# Patient Record
Sex: FEMALE | Race: WHITE | NOT HISPANIC OR LATINO | Employment: FULL TIME | URBAN - METROPOLITAN AREA
[De-identification: names, ages, dates, MRNs, and addresses within clinical notes are randomized per-mention and may not be internally consistent; named-entity substitution may affect disease eponyms.]

---

## 2023-07-24 ENCOUNTER — LAB VISIT (OUTPATIENT)
Dept: LAB | Facility: HOSPITAL | Age: 28
End: 2023-07-24
Attending: OBSTETRICS & GYNECOLOGY
Payer: COMMERCIAL

## 2023-07-24 DIAGNOSIS — N91.2 ABSENCE OF MENSTRUATION: Primary | ICD-10-CM

## 2023-07-24 LAB
B-HCG FREE SERPL-ACNC: 2795.65 MIU/ML
PROGEST SERPL-MCNC: 2 NG/ML

## 2023-07-24 PROCEDURE — 84702 CHORIONIC GONADOTROPIN TEST: CPT

## 2023-07-24 PROCEDURE — 84144 ASSAY OF PROGESTERONE: CPT

## 2023-07-24 PROCEDURE — 36415 COLL VENOUS BLD VENIPUNCTURE: CPT

## 2023-07-27 ENCOUNTER — LAB VISIT (OUTPATIENT)
Dept: LAB | Facility: HOSPITAL | Age: 28
End: 2023-07-27
Attending: OBSTETRICS & GYNECOLOGY
Payer: COMMERCIAL

## 2023-07-27 DIAGNOSIS — N91.2 ABSENCE OF MENSTRUATION: Primary | ICD-10-CM

## 2023-07-27 LAB
B-HCG FREE SERPL-ACNC: 2953.44 MIU/ML
PROGEST SERPL-MCNC: 9.4 NG/ML

## 2023-07-27 PROCEDURE — 84702 CHORIONIC GONADOTROPIN TEST: CPT

## 2023-07-27 PROCEDURE — 84144 ASSAY OF PROGESTERONE: CPT

## 2023-07-27 PROCEDURE — 36415 COLL VENOUS BLD VENIPUNCTURE: CPT

## 2023-10-04 ENCOUNTER — TELEPHONE (OUTPATIENT)
Dept: INTERNAL MEDICINE | Facility: CLINIC | Age: 28
End: 2023-10-04
Payer: COMMERCIAL

## 2023-10-04 DIAGNOSIS — Z13.228 SCREENING FOR ENDOCRINE, NUTRITIONAL, METABOLIC AND IMMUNITY DISORDER: ICD-10-CM

## 2023-10-04 DIAGNOSIS — Z13.0 SCREENING FOR ENDOCRINE, NUTRITIONAL, METABOLIC AND IMMUNITY DISORDER: ICD-10-CM

## 2023-10-04 DIAGNOSIS — Z13.21 SCREENING FOR ENDOCRINE, NUTRITIONAL, METABOLIC AND IMMUNITY DISORDER: ICD-10-CM

## 2023-10-04 DIAGNOSIS — Z13.89 SCREENING FOR CARDIOVASCULAR, RESPIRATORY, AND GENITOURINARY DISEASES: Primary | ICD-10-CM

## 2023-10-04 DIAGNOSIS — Z13.29 SCREENING FOR ENDOCRINE, NUTRITIONAL, METABOLIC AND IMMUNITY DISORDER: ICD-10-CM

## 2023-10-04 DIAGNOSIS — Z13.83 SCREENING FOR CARDIOVASCULAR, RESPIRATORY, AND GENITOURINARY DISEASES: Primary | ICD-10-CM

## 2023-10-04 DIAGNOSIS — Z13.6 SCREENING FOR CARDIOVASCULAR, RESPIRATORY, AND GENITOURINARY DISEASES: Primary | ICD-10-CM

## 2023-11-20 ENCOUNTER — TELEPHONE (OUTPATIENT)
Dept: INTERNAL MEDICINE | Facility: CLINIC | Age: 28
End: 2023-11-20
Payer: COMMERCIAL

## 2023-11-20 NOTE — TELEPHONE ENCOUNTER
----- Message from Allison Malave LPN sent at 11/14/2023  3:46 PM CST -----  Regarding: Dr. Oakes 11/27/2023 Establish care 3:40pm  Are there any outstanding tasks in chart? No    Is there any documentation of tasks? No    Has the pt seen another physician, been to ER, UCC, or admitted to hospital since last visit?    Has the pt done blood work or imaging since last visit?     5. PLEASE HAVE PATIENT BRING MEDICATION LIST OR BOTTLES TO EVERY OFFICE VISIT

## 2023-11-27 ENCOUNTER — OFFICE VISIT (OUTPATIENT)
Dept: INTERNAL MEDICINE | Facility: CLINIC | Age: 28
End: 2023-11-27
Payer: COMMERCIAL

## 2023-11-27 VITALS
SYSTOLIC BLOOD PRESSURE: 110 MMHG | BODY MASS INDEX: 30.54 KG/M2 | WEIGHT: 206.19 LBS | DIASTOLIC BLOOD PRESSURE: 70 MMHG | HEIGHT: 69 IN | OXYGEN SATURATION: 100 % | HEART RATE: 77 BPM

## 2023-11-27 DIAGNOSIS — E01.0 THYROMEGALY: ICD-10-CM

## 2023-11-27 DIAGNOSIS — Z00.00 ANNUAL PHYSICAL EXAM: Primary | ICD-10-CM

## 2023-11-27 DIAGNOSIS — R11.2 NAUSEA AND VOMITING, UNSPECIFIED VOMITING TYPE: Primary | ICD-10-CM

## 2023-11-27 PROCEDURE — 1159F MED LIST DOCD IN RCRD: CPT | Mod: CPTII,,, | Performed by: INTERNAL MEDICINE

## 2023-11-27 PROCEDURE — 3008F BODY MASS INDEX DOCD: CPT | Mod: CPTII,,, | Performed by: INTERNAL MEDICINE

## 2023-11-27 PROCEDURE — 99385 PREV VISIT NEW AGE 18-39: CPT | Mod: ,,, | Performed by: INTERNAL MEDICINE

## 2023-11-27 PROCEDURE — 3078F DIAST BP <80 MM HG: CPT | Mod: CPTII,,, | Performed by: INTERNAL MEDICINE

## 2023-11-27 PROCEDURE — 99385 PR PREVENTIVE VISIT,NEW,18-39: ICD-10-PCS | Mod: ,,, | Performed by: INTERNAL MEDICINE

## 2023-11-27 PROCEDURE — 3078F PR MOST RECENT DIASTOLIC BLOOD PRESSURE < 80 MM HG: ICD-10-PCS | Mod: CPTII,,, | Performed by: INTERNAL MEDICINE

## 2023-11-27 PROCEDURE — 1160F PR REVIEW ALL MEDS BY PRESCRIBER/CLIN PHARMACIST DOCUMENTED: ICD-10-PCS | Mod: CPTII,,, | Performed by: INTERNAL MEDICINE

## 2023-11-27 PROCEDURE — 3074F PR MOST RECENT SYSTOLIC BLOOD PRESSURE < 130 MM HG: ICD-10-PCS | Mod: CPTII,,, | Performed by: INTERNAL MEDICINE

## 2023-11-27 PROCEDURE — 1159F PR MEDICATION LIST DOCUMENTED IN MEDICAL RECORD: ICD-10-PCS | Mod: CPTII,,, | Performed by: INTERNAL MEDICINE

## 2023-11-27 PROCEDURE — 1160F RVW MEDS BY RX/DR IN RCRD: CPT | Mod: CPTII,,, | Performed by: INTERNAL MEDICINE

## 2023-11-27 PROCEDURE — 3008F PR BODY MASS INDEX (BMI) DOCUMENTED: ICD-10-PCS | Mod: CPTII,,, | Performed by: INTERNAL MEDICINE

## 2023-11-27 PROCEDURE — 3074F SYST BP LT 130 MM HG: CPT | Mod: CPTII,,, | Performed by: INTERNAL MEDICINE

## 2023-11-27 RX ORDER — ONDANSETRON 4 MG/1
4 TABLET, ORALLY DISINTEGRATING ORAL EVERY 6 HOURS
COMMUNITY
Start: 2023-07-27 | End: 2023-11-27 | Stop reason: SDUPTHER

## 2023-11-27 RX ORDER — ONDANSETRON 4 MG/1
4 TABLET, ORALLY DISINTEGRATING ORAL EVERY 6 HOURS
Qty: 12 TABLET | Refills: 4 | Status: SHIPPED | OUTPATIENT
Start: 2023-11-27 | End: 2023-11-27 | Stop reason: SDUPTHER

## 2023-11-27 NOTE — PROGRESS NOTES
Patient ID: Ade Simms is a 28 y.o. female.    Chief Complaint: Establish Care      HPI:   Patient presents here today for above reason.     Patient is without any major complaints or concerns at this time.       Wants  to get stablish   has strong family hx of  hypothyroisim  The patient's Health Maintenance was reviewed and the following appears to be due at this time:   Health Maintenance Due   Topic Date Due    Hepatitis C Screening  Never done    Cervical Cancer Screening  Never done    Lipid Panel  Never done    COVID-19 Vaccine (1) Never done    HIV Screening  Never done    TETANUS VACCINE  Never done    Influenza Vaccine (1) Never done        Past Medical History:  Past Medical History:   Diagnosis Date    Family history of genetic disease     Dad has tested positive for: familial thoracic aortic aneurysm and dissection (FTAAD)     History reviewed. No pertinent surgical history.  Review of patient's allergies indicates:  Not on File  Current Outpatient Medications on File Prior to Visit   Medication Sig Dispense Refill    [DISCONTINUED] ondansetron (ZOFRAN-ODT) 4 MG TbDL Take 4 mg by mouth every 6 (six) hours.       No current facility-administered medications on file prior to visit.     Social History     Socioeconomic History    Marital status:    Tobacco Use    Smoking status: Never    Smokeless tobacco: Never     History reviewed. No pertinent family history.    ROS:   Review of Systems  Constitutional: No weight gain, No fever, No chills, No fatigue.   Eyes: No blurring, No visual disturbances.   Ear/Nose/Mouth/Throat: No decreased hearing, No ear pain, No nasal congestion, No sore throat.   Respiratory: No shortness of breath, No cough, No wheezing.   Cardiovascular: No chest pain, No palpitations, No peripheral edema.   Gastrointestinal: No nausea, No vomiting, No diarrhea, No constipation, No abdominal pain.   Genitourinary: No dysuria, No hematuria.   Hematology/Lymphatics: No  "bruising tendency, No bleeding tendency, No swollen lymph glands.   Endocrine: No excessive thirst, No polyuria, No excessive hunger.   Musculoskeletal: No joint pain, No muscle pain, No decreased range of motion.   Integumentary: No rash, No pruritus.   Neurologic: No abnormal balance, No confusion, No headache.   Psychiatric: No anxiety, No depression, Not suicidal, No hallucinations.      Vitals/PE:   /70 (BP Location: Left arm, Patient Position: Sitting, BP Method: Medium (Manual))   Pulse 77   Ht 5' 9" (1.753 m)   Wt 93.5 kg (206 lb 3.2 oz)   SpO2 100%   BMI 30.45 kg/m²   Physical Exam    General: Alert and oriented, No acute distress.   Eye: Normal conjunctiva without exudate.  HENMT: Normocephalic/AT, Normal hearing, Oral mucosa is moist and pink   Neck: No goiter visualized.   Respiratory: Lungs CTAB, Respirations are non-labored, Breath sounds are equal, Symmetrical chest wall expansion.  Cardiovascular: Normal rate, Regular rhythm, No murmur, No edema.   Gastrointestinal: Non-distended.   Genitourinary: Deferred.  Musculoskeletal: Normal ROM, Normal gait, No deformities or amputations.  Integumentary: Warm, Dry, Intact. No diaphoresis, or flushing.  Neurologic: No focal deficits, Cranial Nerves II-XII are grossly intact.   Psychiatric: Cooperative, Appropriate mood & affect, Normal judgment, Non-suicidal.    Assessment/Plan:    ..  Problem List Items Addressed This Visit    None  Visit Diagnoses       Annual physical exam    -  Primary    CMP  FLP  TSH T4  testosterone levles    Relevant Orders    Comprehensive Metabolic Panel    TSH    T4, Free    Testosterone           Recommendations:   Diet (healthy food choices, reduce portions and overall calorie intake)  Exercise 30-45 minutes 3x per week  Avoid excessive alcohol and tobacco  Stay UTD with immunizations and preventative exams  Monthly self breast exams   Yearly Labs     ..  Medications Ordered This Encounter   Medications    ondansetron " (ZOFRAN-ODT) 4 MG TbDL     Sig: Take 1 tablet (4 mg total) by mouth every 6 (six) hours.     Dispense:  12 tablet     Refill:  4        ..  Orders Placed This Encounter   Procedures    Comprehensive Metabolic Panel    TSH    T4, Free    Testosterone         I have changed Ade Simms's ondansetron.    Orders Placed This Encounter   Procedures    Comprehensive Metabolic Panel     Standing Status:   Future     Standing Expiration Date:   2/27/2024    TSH     Standing Status:   Future     Standing Expiration Date:   2/27/2024    T4, Free     Standing Status:   Future     Standing Expiration Date:   1/25/2025    Testosterone     Standing Status:   Future     Standing Expiration Date:   1/25/2025       Education and counseling done face to face regarding medical conditions and plan. Contact office if new symptoms develop. Should any symptoms ever significantly worsen seek emergency medical attention/go to ER. Follow up at least yearly for wellness or sooner PRN. Nurse to call patient with any results. The patient is receptive, expresses understanding and is agreeable to plan. All questions have been answered.    Follow up in about 4 months (around 3/27/2024).

## 2023-11-28 RX ORDER — ONDANSETRON 4 MG/1
4 TABLET, ORALLY DISINTEGRATING ORAL EVERY 6 HOURS
Qty: 12 TABLET | Refills: 4 | Status: SHIPPED | OUTPATIENT
Start: 2023-11-28

## 2023-12-01 ENCOUNTER — HOSPITAL ENCOUNTER (OUTPATIENT)
Dept: RADIOLOGY | Facility: HOSPITAL | Age: 28
Discharge: HOME | End: 2023-12-01
Attending: INTERNAL MEDICINE
Payer: COMMERCIAL

## 2023-12-01 DIAGNOSIS — E01.0 THYROMEGALY: ICD-10-CM

## 2023-12-01 PROCEDURE — 76536 US EXAM OF HEAD AND NECK: CPT | Mod: TC

## 2023-12-11 ENCOUNTER — TELEPHONE (OUTPATIENT)
Dept: INTERNAL MEDICINE | Facility: CLINIC | Age: 28
End: 2023-12-11
Payer: COMMERCIAL

## 2023-12-11 NOTE — TELEPHONE ENCOUNTER
----- Message from Fidel Oakes MD sent at 12/7/2023  5:23 PM CST -----  Essentially normal thyroid ultrasound with no evidence of nodules no to meet criteria to do biopsy

## 2024-01-16 ENCOUNTER — LAB VISIT (OUTPATIENT)
Dept: LAB | Facility: HOSPITAL | Age: 29
End: 2024-01-16
Attending: OBSTETRICS & GYNECOLOGY
Payer: COMMERCIAL

## 2024-01-16 DIAGNOSIS — O09.291 PREVIOUS CHILD WITH ANOMALY, ANTEPARTUM, FIRST TRIMESTER: Primary | ICD-10-CM

## 2024-01-16 LAB
B-HCG FREE SERPL-ACNC: 231.55 MIU/ML
PROGEST SERPL-MCNC: 1.7 NG/ML

## 2024-01-16 PROCEDURE — 36415 COLL VENOUS BLD VENIPUNCTURE: CPT

## 2024-01-16 PROCEDURE — 84144 ASSAY OF PROGESTERONE: CPT

## 2024-01-16 PROCEDURE — 84702 CHORIONIC GONADOTROPIN TEST: CPT

## 2024-01-22 ENCOUNTER — LAB VISIT (OUTPATIENT)
Dept: LAB | Facility: HOSPITAL | Age: 29
End: 2024-01-22
Attending: OBSTETRICS & GYNECOLOGY
Payer: COMMERCIAL

## 2024-01-22 DIAGNOSIS — O09.291 PREVIOUS CHILD WITH ANOMALY, ANTEPARTUM, FIRST TRIMESTER: Primary | ICD-10-CM

## 2024-01-22 LAB
B-HCG FREE SERPL-ACNC: 3861.91 MIU/ML
PROGEST SERPL-MCNC: 38.9 NG/ML

## 2024-01-22 PROCEDURE — 84702 CHORIONIC GONADOTROPIN TEST: CPT

## 2024-01-22 PROCEDURE — 84144 ASSAY OF PROGESTERONE: CPT

## 2024-01-22 PROCEDURE — 36415 COLL VENOUS BLD VENIPUNCTURE: CPT

## 2024-01-29 ENCOUNTER — APPOINTMENT (OUTPATIENT)
Dept: LAB | Facility: HOSPITAL | Age: 29
End: 2024-01-29
Attending: OBSTETRICS & GYNECOLOGY
Payer: COMMERCIAL

## 2024-01-29 DIAGNOSIS — O09.291 PREVIOUS CHILD WITH ANOMALY, ANTEPARTUM, FIRST TRIMESTER: Primary | ICD-10-CM

## 2024-01-29 LAB
B-HCG FREE SERPL-ACNC: ABNORMAL MIU/ML
PROGEST SERPL-MCNC: 9 NG/ML

## 2024-01-29 PROCEDURE — 84144 ASSAY OF PROGESTERONE: CPT

## 2024-01-29 PROCEDURE — 84702 CHORIONIC GONADOTROPIN TEST: CPT

## 2024-01-29 PROCEDURE — 36415 COLL VENOUS BLD VENIPUNCTURE: CPT

## 2024-02-12 ENCOUNTER — LAB VISIT (OUTPATIENT)
Dept: LAB | Facility: HOSPITAL | Age: 29
End: 2024-02-12
Attending: OBSTETRICS & GYNECOLOGY
Payer: COMMERCIAL

## 2024-02-12 DIAGNOSIS — Z36.2 ANTENATAL SCREENING BASED ON AMNIOCENTESIS: Primary | ICD-10-CM

## 2024-02-12 LAB — PROGEST SERPL-MCNC: 17 NG/ML

## 2024-02-12 PROCEDURE — 36415 COLL VENOUS BLD VENIPUNCTURE: CPT

## 2024-02-12 PROCEDURE — 84144 ASSAY OF PROGESTERONE: CPT

## 2024-02-26 PROBLEM — Z00.00 ANNUAL PHYSICAL EXAM: Status: RESOLVED | Noted: 2023-11-27 | Resolved: 2024-02-26

## 2024-03-12 ENCOUNTER — LAB VISIT (OUTPATIENT)
Dept: LAB | Facility: HOSPITAL | Age: 29
End: 2024-03-12
Attending: OBSTETRICS & GYNECOLOGY
Payer: COMMERCIAL

## 2024-03-12 DIAGNOSIS — Z36.2 ANTENATAL SCREENING BASED ON AMNIOCENTESIS: Primary | ICD-10-CM

## 2024-03-12 LAB
C TRACH DNA SPEC QL NAA+PROBE: NOT DETECTED
ERYTHROCYTE [DISTWIDTH] IN BLOOD BY AUTOMATED COUNT: 12 % (ref 11.5–17)
GROUP & RH: NORMAL
HCT VFR BLD AUTO: 37.1 % (ref 37–47)
HGB BLD-MCNC: 13.6 G/DL (ref 12–16)
INDIRECT COOMBS: NORMAL
MCH RBC QN AUTO: 31.5 PG (ref 27–31)
MCHC RBC AUTO-ENTMCNC: 36.7 G/DL (ref 33–36)
MCV RBC AUTO: 85.9 FL (ref 80–94)
N GONORRHOEA DNA SPEC QL NAA+PROBE: NOT DETECTED
NRBC BLD AUTO-RTO: 0 %
PLATELET # BLD AUTO: 281 X10(3)/MCL (ref 130–400)
PMV BLD AUTO: 9.8 FL (ref 7.4–10.4)
RBC # BLD AUTO: 4.32 X10(6)/MCL (ref 4.2–5.4)
SOURCE (OHS): NORMAL
SPECIMEN OUTDATE: NORMAL
T PALLIDUM AB SER QL: NONREACTIVE
WBC # SPEC AUTO: 11.28 X10(3)/MCL (ref 4.5–11.5)

## 2024-03-12 PROCEDURE — 87491 CHLMYD TRACH DNA AMP PROBE: CPT

## 2024-03-12 PROCEDURE — 86762 RUBELLA ANTIBODY: CPT

## 2024-03-12 PROCEDURE — 85027 COMPLETE CBC AUTOMATED: CPT

## 2024-03-12 PROCEDURE — 36415 COLL VENOUS BLD VENIPUNCTURE: CPT

## 2024-03-12 PROCEDURE — 87086 URINE CULTURE/COLONY COUNT: CPT

## 2024-03-12 PROCEDURE — 86901 BLOOD TYPING SEROLOGIC RH(D): CPT | Performed by: OBSTETRICS & GYNECOLOGY

## 2024-03-12 PROCEDURE — 87340 HEPATITIS B SURFACE AG IA: CPT

## 2024-03-12 PROCEDURE — 86780 TREPONEMA PALLIDUM: CPT

## 2024-03-12 PROCEDURE — 86803 HEPATITIS C AB TEST: CPT

## 2024-03-12 PROCEDURE — 87389 HIV-1 AG W/HIV-1&-2 AB AG IA: CPT

## 2024-03-13 LAB
HBV SURFACE AG SERPL QL IA: NONREACTIVE
HCV AB SERPL QL IA: NONREACTIVE
HIV 1+2 AB+HIV1 P24 AG SERPL QL IA: NONREACTIVE
RUBV IGG SERPL IA-ACNC: 2.4
RUBV IGG SERPL QL IA: POSITIVE

## 2024-03-14 LAB — BACTERIA UR CULT: NORMAL

## 2024-03-19 ENCOUNTER — TELEPHONE (OUTPATIENT)
Dept: INTERNAL MEDICINE | Facility: CLINIC | Age: 29
End: 2024-03-19
Payer: COMMERCIAL

## 2024-03-19 DIAGNOSIS — Z36.89 ENCOUNTER FOR FETAL ANATOMIC SURVEY: Primary | ICD-10-CM

## 2024-03-19 NOTE — TELEPHONE ENCOUNTER
----- Message from Allison Malave LPN sent at 3/19/2024  8:08 AM CDT -----  Regarding: Dr. Oakes 03/27/2024 4 month rv 2:20pm  Are there any outstanding tasks in chart? No    Is there any documentation of tasks? No    Has the pt seen another physician, been to ER, UCC, or admitted to hospital since last visit?    Has the pt done blood work or imaging since last visit?     5. PLEASE HAVE PATIENT BRING MEDICATION LIST OR BOTTLES TO EVERY OFFICE VISIT

## 2024-03-21 ENCOUNTER — OFFICE VISIT (OUTPATIENT)
Dept: MATERNAL FETAL MEDICINE | Facility: CLINIC | Age: 29
End: 2024-03-21
Payer: COMMERCIAL

## 2024-03-21 ENCOUNTER — PROCEDURE VISIT (OUTPATIENT)
Dept: MATERNAL FETAL MEDICINE | Facility: CLINIC | Age: 29
End: 2024-03-21
Payer: COMMERCIAL

## 2024-03-21 VITALS
HEIGHT: 69 IN | HEART RATE: 94 BPM | WEIGHT: 218.69 LBS | BODY MASS INDEX: 32.39 KG/M2 | DIASTOLIC BLOOD PRESSURE: 75 MMHG | SYSTOLIC BLOOD PRESSURE: 118 MMHG

## 2024-03-21 DIAGNOSIS — O35.2XX1 HEREDITARY DISEASE IN FAMILY POSSIBLY AFFECTING FETUS, FETUS 1: Primary | ICD-10-CM

## 2024-03-21 DIAGNOSIS — Z36.89 ENCOUNTER FOR FETAL ANATOMIC SURVEY: ICD-10-CM

## 2024-03-21 PROCEDURE — 1159F MED LIST DOCD IN RCRD: CPT | Mod: CPTII,S$GLB,, | Performed by: OBSTETRICS & GYNECOLOGY

## 2024-03-21 PROCEDURE — 3008F BODY MASS INDEX DOCD: CPT | Mod: CPTII,S$GLB,, | Performed by: OBSTETRICS & GYNECOLOGY

## 2024-03-21 PROCEDURE — 1160F RVW MEDS BY RX/DR IN RCRD: CPT | Mod: CPTII,S$GLB,, | Performed by: OBSTETRICS & GYNECOLOGY

## 2024-03-21 PROCEDURE — 3078F DIAST BP <80 MM HG: CPT | Mod: CPTII,S$GLB,, | Performed by: OBSTETRICS & GYNECOLOGY

## 2024-03-21 PROCEDURE — 3074F SYST BP LT 130 MM HG: CPT | Mod: CPTII,S$GLB,, | Performed by: OBSTETRICS & GYNECOLOGY

## 2024-03-21 PROCEDURE — 76817 TRANSVAGINAL US OBSTETRIC: CPT | Mod: S$GLB,,, | Performed by: OBSTETRICS & GYNECOLOGY

## 2024-03-21 PROCEDURE — 99205 OFFICE O/P NEW HI 60 MIN: CPT | Mod: S$GLB,,, | Performed by: OBSTETRICS & GYNECOLOGY

## 2024-03-21 RX ORDER — METOPROLOL SUCCINATE 25 MG/1
25 TABLET, EXTENDED RELEASE ORAL DAILY
COMMUNITY

## 2024-03-21 NOTE — PROGRESS NOTES
MATERNAL-FETAL MEDICINE   CONSULT NOTE    Provider requesting consultation: Dr Aly    SUBJECTIVE:     Ms. Ade Simms is a 28 y.o.  female with IUP at 13w5d who is seen in consultation by MFM for evaluation and management of:  Problem   1. Hereditary disease in family possibly affecting fetus, fetus 1     Ade is being referred for a personal and family history of a gene mutation (ACTA-2). Her father and sister are affected and have both had aortic dissections. She has been followed by a cardiologist at the [facility] who recommends Metoprolol during pregnancy. Last eval 2024.  At that time she had TTE with LVEF 60-64%, aortic root 3.0cm. She has no history of HTN. She has never had a dissection or an event in the past.   She is currently taking ASA 81mg.   She is undecided on genetic screening for the pregnancy.       Medication List with Changes/Refills   Current Medications    METOPROLOL SUCCINATE (TOPROL-XL) 25 MG 24 HR TABLET    Take 25 mg by mouth once daily.    ONDANSETRON (ZOFRAN-ODT) 4 MG TBDL    Take 1 tablet (4 mg total) by mouth every 6 (six) hours.       Review of patient's allergies indicates:  No Known Allergies    PMH:  Past Medical History:   Diagnosis Date    Family history of genetic disease     Dad has tested positive for: familial thoracic aortic aneurysm and dissection (FTAAD)       PObHx:  OB History    Para Term  AB Living   2       1     SAB IAB Ectopic Multiple Live Births   1              # Outcome Date GA Lbr Charles/2nd Weight Sex Delivery Anes PTL Lv   2 Current            1 SAB 2023 6w0d    SAB         Birth Comments: D&C       PSH:  Past Surgical History:   Procedure Laterality Date    CHOLECYSTECTOMY      DILATION AND CURETTAGE OF UTERUS         Family history:family history includes Aortic dissection in her father.    Social history: reports that she has never smoked. She has never used smokeless tobacco. She reports that she does not  "currently use alcohol. She reports that she does not use drugs.    Genetic history: Her, her father, and her sister - ACTA2 gene mutation. Her sister and her father- aortic dissections. The patient denies any inherited genetic diseases or birth defects in herself or her partner's personal history or family.    Objective:   /75 (BP Location: Left arm)   Pulse 94   Ht 5' 9" (1.753 m)   Wt 99.2 kg (218 lb 11.1 oz)   BMI 32.30 kg/m²     Ultrasound performed. See viewpoint for full ultrasound report.    A viable doan intrauterine pregnancy is visualized consistent with the given NELI.   Early anatomic survey reveals no abnormalities. Placenta is posterior/fundal.    ASSESSMENT/PLAN:     28 y.o.  female with IUP at 13w5d     1. 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 Her father and her sister also have this gene mutation. Her father had an aortic dissection at the age of 50. Her sister had a complicated type B dissection at the age of 26 and survived. Her sister had three successful pregnancies.   She's been followed by a cardiologist at the [facility]. She had normal aortic imaging approx 6-7 yrs ago. Her last evaluation was 24 where echo results were normal (Aortic root 3.0; EF 60%).     Mutations in ACTA2 (codes for smooth muscle specific alpha-actin, a critical component of the contractile apparatus of the vascular smooth muscle cell) predispose to thoracic aortic aneurysms and dissection as well as coronary artery and cerebrovascular disease. In one case control study, 53 women with this mutation were followed in 137 pregnancies.  eight had aortic dissections in the third trimester or the postpartum period (6% of pregnancies). One woman also had a myocardial infarct that occurred during pregnancy that was independent of her aortic dissection. Compared to the population-based " frequency of peripartum aortic dissections of 0.6%, the rate of peripartum aortic dissections in women with ACTA2 mutations is much higher (8 out of 39; 20%). Six of these dissections initiated in the ascending aorta (Anthony type A), three were fatal. Three women had ascending aortic dissections at diameters less that 5.0?cm (range 3.8-4.7?cm). Aortic pathology showed mild to moderate medial degeneration of the aorta in three women. Of note, five of the women had hypertension either during or before the pregnancy. In summary, the majority of women with ACTA2 mutations did not have aortic or other vascular complications with pregnancy. However, these findings show that pregnancy is associated with significant risk for aortic dissection in women with ACTA2 mutations. Women with ACTA2 mutations who are planning to get pregnant should be counseled about this risk of aortic dissection, and proper clinical management should be initiated to reduce this risk.  Other associated features of this mutation may be ocular abnormalities (iris flocculi) and skin manifestations (livedo reticularis).     When managing this disease in pregnancy, guidelines are limited and extrapolated on other aortopathy syndromes. Some guidance exists ( https://www.obstetanesthesia.com/article/-308I(17)05608-4/fulltext) and includes the following considerations:     - Tight BP control with goal of 120/80. Recommend beta blockade during pregnancy. (She will start the metoprolol)  - Cardiology co-management is essential (she will be referred to an [facility] cardologist as she will likely delivery in tertiary care facility in [place] due to the availability of Cardiac Anesthesia. We will set her up there for initial consultation and determine local follow up and regimen after that time. She is due now for repeat aortic/ SHARLENE imaging. She also should have serial MRI (NO gadolinium in pregnancy) and abdominal US for descending aorta imaging  in the pregnancy.   - Complications are most probable in the third trimester and postpartum periods. High vigilance for any changes in cardiovascular or neurologic status. She has been counseled  - Delivery planning: Vaginal birth can be considered with early epidural to limit hemodynamic shifts/ increased cardiac output, and assisted second stage. Pitocin may be associated with theoretical harm and should be limited to small doses/ no routine pitocin postpartum. Consider telemetry in labor.   - Postpartum- Consider ICU monitoring for 24 hrs with telemetry as this is the highest risk period.   - At risk for atony/ postpartum hemorrhage; complete high risk protocols with T&C, IV access, avoid methergine and hemabate if able due to smooth muscle effects. Use primarily fundal massage and cytotec. Use TXA.   - Breastfeeding is relatively contraindicated due to oxytocin release.     Recommendations  - Start metoprolol now; home BP monitoring discussed  - Cardiology referral sent  - Currently considering delivery at [facility] due to high risk maternal condition/ cardiac anesthesia/ availability of subspecialties.   - Strict precautions given  - ASA 81mg recommended  - Fetal growth assessment q4 weeks  - Antepartum surveillance 2x weekly at 32 weeks.   - Delivery at 37 weeks       FOLLOW UP:   F/u in 4 weeks for US/MFM visit    This consultation was completed with the assistance of Nessa Roper NP.      Today I spent 70 minutes in the care of Ms. Simms. This includes face to face time and non-face to face time preparing to see the patient (eg, review of tests), obtaining and/or reviewing separately obtained history, documenting clinical information in the electronic or other health record, independently interpreting result sand communicating results to the patient/family/caregiver, or care coordination.       Meli Samayoa MD  Maternal Fetal Medicine

## 2024-03-21 NOTE — ASSESSMENT & PLAN NOTE
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 Her father and her sister also have this gene mutation. Her father had an aortic dissection at the age of 50. Her sister had a complicated type B dissection at the age of 26 and survived. Her sister had three successful pregnancies.   She's been followed by a cardiologist at the [facility]. She had normal aortic imaging approx 6-7 yrs ago. Her last evaluation was 1/31/24 where echo results were normal (Aortic root 3.0; EF 60%).     Mutations in ACTA2 (codes for smooth muscle specific alpha-actin, a critical component of the contractile apparatus of the vascular smooth muscle cell) predispose to thoracic aortic aneurysms and dissection as well as coronary artery and cerebrovascular disease. In one case control study, 53 women with this mutation were followed in 137 pregnancies.  eight had aortic dissections in the third trimester or the postpartum period (6% of pregnancies). One woman also had a myocardial infarct that occurred during pregnancy that was independent of her aortic dissection. Compared to the population-based frequency of peripartum aortic dissections of 0.6%, the rate of peripartum aortic dissections in women with ACTA2 mutations is much higher (8 out of 39; 20%). Six of these dissections initiated in the ascending aorta (Anthony type A), three were fatal. Three women had ascending aortic dissections at diameters less that 5.0?cm (range 3.8-4.7?cm). Aortic pathology showed mild to moderate medial degeneration of the aorta in three women. Of note, five of the women had hypertension either during or before the pregnancy. In summary, the majority of women with ACTA2 mutations did not have aortic or other vascular complications with pregnancy. However, these findings show that pregnancy is associated with significant risk for aortic dissection in women with ACTA2 mutations. Women with ACTA2 mutations who are  planning to get pregnant should be counseled about this risk of aortic dissection, and proper clinical management should be initiated to reduce this risk.  Other associated features of this mutation may be ocular abnormalities (iris flocculi) and skin manifestations (livedo reticularis).     When managing this disease in pregnancy, guidelines are limited and extrapolated on other aortopathy syndromes. Some guidance exists ( https://www.[employer].com/article/-160Y(59)11222-4/fulltext) and includes the following considerations:     - Tight BP control with goal of 120/80. Recommend beta blockade during pregnancy. (She will start the metoprolol)  - Cardiology co-management is essential (she will be referred to an [facility] cardologist as she will likely delivery in tertiary care facility in [place] due to the availability of Cardiac Anesthesia. We will set her up there for initial consultation and determine local follow up and regimen after that time. She is due now for repeat aortic/ SHARLENE imaging. She also should have serial MRI (NO gadolinium in pregnancy) and abdominal US for descending aorta imaging in the pregnancy.   - Complications are most probable in the third trimester and postpartum periods. High vigilance for any changes in cardiovascular or neurologic status. She has been counseled  - Delivery planning: Vaginal birth can be considered with early epidural to limit hemodynamic shifts/ increased cardiac output, and assisted second stage. Pitocin may be associated with theoretical harm and should be limited to small doses/ no routine pitocin postpartum. Consider telemetry in labor.   - Postpartum- Consider ICU monitoring for 24 hrs with telemetry as this is the highest risk period.   - At risk for atony/ postpartum hemorrhage; complete high risk protocols with T&C, IV access, avoid methergine and hemabate if able due to smooth muscle effects. Use primarily fundal massage and cytotec. Use  TXA.   - Breastfeeding is relatively contraindicated due to oxytocin release.     Recommendations  - Start metoprolol now; home BP monitoring discussed  - Cardiology referral sent  - Currently considering delivery at [facility] due to high risk maternal condition/ cardiac anesthesia/ availability of subspecialties.   - Strict precautions given  - ASA 81mg recommended  - Fetal growth assessment q4 weeks  - Antepartum surveillance 2x weekly at 32 weeks.   - Delivery at 37 weeks

## 2024-03-28 DIAGNOSIS — O35.2XX1 HEREDITARY DISEASE IN FAMILY POSSIBLY AFFECTING FETUS, FETUS 1: Primary | ICD-10-CM

## 2024-03-28 DIAGNOSIS — Z36.89 ENCOUNTER FOR FETAL ANATOMIC SURVEY: Primary | ICD-10-CM

## 2024-04-10 DIAGNOSIS — I71.00 AORTIC ANEURYSM AND DISSECTION: Primary | ICD-10-CM

## 2024-04-19 ENCOUNTER — OFFICE VISIT (OUTPATIENT)
Dept: MATERNAL FETAL MEDICINE | Facility: CLINIC | Age: 29
End: 2024-04-19
Payer: COMMERCIAL

## 2024-04-19 ENCOUNTER — OFFICE VISIT (OUTPATIENT)
Dept: ANESTHESIOLOGY | Facility: OTHER | Age: 29
End: 2024-04-19
Payer: COMMERCIAL

## 2024-04-19 ENCOUNTER — OFFICE VISIT (OUTPATIENT)
Dept: CARDIOLOGY | Facility: CLINIC | Age: 29
End: 2024-04-19
Payer: COMMERCIAL

## 2024-04-19 ENCOUNTER — PROCEDURE VISIT (OUTPATIENT)
Dept: MATERNAL FETAL MEDICINE | Facility: CLINIC | Age: 29
End: 2024-04-19
Payer: COMMERCIAL

## 2024-04-19 VITALS
HEIGHT: 69 IN | HEART RATE: 57 BPM | DIASTOLIC BLOOD PRESSURE: 70 MMHG | WEIGHT: 223 LBS | SYSTOLIC BLOOD PRESSURE: 102 MMHG | BODY MASS INDEX: 33.03 KG/M2

## 2024-04-19 VITALS
HEIGHT: 69 IN | BODY MASS INDEX: 32.65 KG/M2 | WEIGHT: 220.44 LBS | SYSTOLIC BLOOD PRESSURE: 118 MMHG | DIASTOLIC BLOOD PRESSURE: 74 MMHG

## 2024-04-19 DIAGNOSIS — O35.2XX1 HEREDITARY DISEASE IN FAMILY POSSIBLY AFFECTING FETUS, FETUS 1: ICD-10-CM

## 2024-04-19 DIAGNOSIS — O35.2XX1 HEREDITARY DISEASE IN FAMILY POSSIBLY AFFECTING FETUS, FETUS 1: Primary | ICD-10-CM

## 2024-04-19 DIAGNOSIS — Z82.49 FAMILY HISTORY OF AORTIC DISSECTION: Primary | ICD-10-CM

## 2024-04-19 DIAGNOSIS — Z3A.18 18 WEEKS GESTATION OF PREGNANCY: ICD-10-CM

## 2024-04-19 DIAGNOSIS — Z36.89 ENCOUNTER FOR FETAL ANATOMIC SURVEY: ICD-10-CM

## 2024-04-19 PROCEDURE — 93010 ELECTROCARDIOGRAM REPORT: CPT | Mod: S$GLB,,, | Performed by: INTERNAL MEDICINE

## 2024-04-19 PROCEDURE — 3078F DIAST BP <80 MM HG: CPT | Mod: CPTII,S$GLB,, | Performed by: OBSTETRICS & GYNECOLOGY

## 2024-04-19 PROCEDURE — 1159F MED LIST DOCD IN RCRD: CPT | Mod: CPTII,S$GLB,, | Performed by: INTERNAL MEDICINE

## 2024-04-19 PROCEDURE — 3008F BODY MASS INDEX DOCD: CPT | Mod: CPTII,S$GLB,, | Performed by: OBSTETRICS & GYNECOLOGY

## 2024-04-19 PROCEDURE — 99999 PR PBB SHADOW E&M-EST. PATIENT-LVL II: CPT | Mod: PBBFAC,,, | Performed by: OBSTETRICS & GYNECOLOGY

## 2024-04-19 PROCEDURE — 76811 OB US DETAILED SNGL FETUS: CPT | Mod: S$GLB,,, | Performed by: OBSTETRICS & GYNECOLOGY

## 2024-04-19 PROCEDURE — 3074F SYST BP LT 130 MM HG: CPT | Mod: CPTII,S$GLB,, | Performed by: INTERNAL MEDICINE

## 2024-04-19 PROCEDURE — 99204 OFFICE O/P NEW MOD 45 MIN: CPT | Mod: S$GLB,,, | Performed by: INTERNAL MEDICINE

## 2024-04-19 PROCEDURE — 93005 ELECTROCARDIOGRAM TRACING: CPT | Mod: S$GLB,,, | Performed by: INTERNAL MEDICINE

## 2024-04-19 PROCEDURE — 99215 OFFICE O/P EST HI 40 MIN: CPT | Mod: 25,S$GLB,, | Performed by: OBSTETRICS & GYNECOLOGY

## 2024-04-19 PROCEDURE — 3074F SYST BP LT 130 MM HG: CPT | Mod: CPTII,S$GLB,, | Performed by: OBSTETRICS & GYNECOLOGY

## 2024-04-19 PROCEDURE — 3008F BODY MASS INDEX DOCD: CPT | Mod: CPTII,S$GLB,, | Performed by: INTERNAL MEDICINE

## 2024-04-19 PROCEDURE — 1160F RVW MEDS BY RX/DR IN RCRD: CPT | Mod: CPTII,S$GLB,, | Performed by: INTERNAL MEDICINE

## 2024-04-19 PROCEDURE — 3078F DIAST BP <80 MM HG: CPT | Mod: CPTII,S$GLB,, | Performed by: INTERNAL MEDICINE

## 2024-04-19 RX ORDER — NAPROXEN SODIUM 220 MG/1
81 TABLET, FILM COATED ORAL DAILY
COMMUNITY

## 2024-04-19 NOTE — PROGRESS NOTES
Subjective:   Patient ID:  Ade Simms is a 28 y.o. 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 female with a diagnosis of TAD diagnoses: Familial thoracic aortic disease who was referred by her family for evaluation during her first pregnancy (). She is approximately 6 weeks. She currently denies any chest pain, dyspnea on exertion, palpitations, leg swelling or orthopnea. 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 She has never been on any medical therapy. Her medical history is notable for one miscarriage ~6 months ago. She enquired about getting [facility] referral in [place]. She has an appointment with her OB-GYN next week.    Currently she denies chest pain, palpitations, or other CV symptoms.    Her father and older sister are my patients. Father Hernan Casey is the proband. His daughter Bridgette Alcazar was diagnosed at age 26 after she survived a complicated Type B dissection. In . Bridgette Vo had 3 pregnancies and has experienced mild aortic enlargement during her last pregnancy in . All 3 have ACTA2 [id number].       Echo 2024  Interpretation Summary   A complete two-dimensional transthoracic echocardiogram was performed (2D,   M-mode, PW/CW Doppler and color flow Doppler). Myocardial strain imaging was   performed. This was essentially a normal study. There is no comparison study   available.   Normal LV size, systolic function, and wall motion. Estimated LVEF is 60-64%.   Normal diastolic function and LV strain.   Normal RV size and systolic function.   No valvular abnormalities.   The aortic root (3.0), ascending (3.0), arch, descending, and abdominal aorta   are all normal in  "size.     HPI:   First pregnancy  18 weeks  No chest pain, Orthopnea, PND of heart failure symptoms.   Denies palpitations or fluttering in the chest      Patient Active Problem List   Diagnosis    Thyromegaly    1. Hereditary disease in family possibly affecting fetus, fetus 1     /70   Pulse (!) 57   Ht 5' 9" (1.753 m)   Wt 101.2 kg (223 lb)   BMI 32.93 kg/m²   Body mass index is 32.93 kg/m².  CrCl cannot be calculated (Patient's most recent lab result is older than the maximum 7 days allowed.).    Lab Results   Component Value Date     12/01/2023    K 4.1 12/01/2023    CO2 26 12/01/2023    BUN 10.5 12/01/2023    CREATININE 0.87 12/01/2023    AST 30 12/01/2023    ALT 27 12/01/2023    ALBUMIN 4.3 12/01/2023    BILITOT 1.3 12/01/2023    WBC 11.28 03/12/2024    HGB 13.6 03/12/2024    HCT 37.1 03/12/2024    MCV 85.9 03/12/2024     03/12/2024    TSH 1.153 12/01/2023       Current Outpatient Medications   Medication Sig Dispense Refill    aspirin 81 MG Chew Take 81 mg by mouth once daily.      metoprolol succinate (TOPROL-XL) 25 MG 24 hr tablet Take 25 mg by mouth once daily.      ondansetron (ZOFRAN-ODT) 4 MG TbDL Take 1 tablet (4 mg total) by mouth every 6 (six) hours. (Patient taking differently: Take 4 mg by mouth as needed.) 12 tablet 4     No current facility-administered medications for this visit.       Review of Systems   Constitutional: Negative for chills, decreased appetite, malaise/fatigue, night sweats, weight gain and weight loss.   Eyes:  Negative for blurred vision, double vision, visual disturbance and visual halos.   Cardiovascular:  Negative for chest pain, claudication, cyanosis, dyspnea on exertion, irregular heartbeat, leg swelling, near-syncope, orthopnea, palpitations, paroxysmal nocturnal dyspnea and syncope.   Respiratory:  Negative for cough, hemoptysis, snoring, sputum production and wheezing.    Endocrine: Negative for cold intolerance, heat intolerance, polydipsia " and polyphagia.   Hematologic/Lymphatic: Negative for adenopathy and bleeding problem. Does not bruise/bleed easily.   Skin:  Negative for flushing, itching, poor wound healing and rash.   Musculoskeletal:  Negative for arthritis, back pain, falls, gout, joint pain, joint swelling, muscle cramps, muscle weakness, myalgias, neck pain and stiffness.   Gastrointestinal:  Negative for bloating, abdominal pain, anorexia, diarrhea, dysphagia, excessive appetite, flatus, hematemesis, jaundice, melena and nausea.   Genitourinary:  Negative for hesitancy and incomplete emptying.   Neurological:  Negative for aphonia, brief paralysis, difficulty with concentration, disturbances in coordination, excessive daytime sleepiness, dizziness, focal weakness, light-headedness, loss of balance and weakness.   Psychiatric/Behavioral:  Negative for altered mental status, depression, hallucinations, hypervigilance, memory loss, substance abuse and suicidal ideas. The patient does not have insomnia and is not nervous/anxious.        Objective:   Physical Exam  Constitutional:       General: She is not in acute distress.     Appearance: She is well-developed. She is not diaphoretic.   HENT:      Head: Normocephalic and atraumatic.      Nose: Nose normal.      Mouth/Throat:      Pharynx: No oropharyngeal exudate.   Eyes:      General: No scleral icterus.        Right eye: No discharge.         Left eye: No discharge.      Conjunctiva/sclera: Conjunctivae normal.      Pupils: Pupils are equal, round, and reactive to light.   Neck:      Thyroid: No thyromegaly.      Vascular: No JVD.      Trachea: No tracheal deviation.   Cardiovascular:      Rate and Rhythm: Normal rate and regular rhythm.      Pulses: Intact distal pulses.      Heart sounds: Normal heart sounds. No murmur heard.     No friction rub. No gallop.   Pulmonary:      Effort: Pulmonary effort is normal. No respiratory distress.      Breath sounds: Normal breath sounds. No stridor.  No wheezing or rales.   Chest:      Chest wall: No tenderness.   Abdominal:      General: Bowel sounds are normal. There is no distension.      Palpations: Abdomen is soft. There is no mass.      Tenderness: There is no abdominal tenderness. There is no guarding or rebound.   Musculoskeletal:         General: No tenderness. Normal range of motion.      Cervical back: Normal range of motion and neck supple.   Lymphadenopathy:      Cervical: No cervical adenopathy.   Skin:     General: Skin is warm.      Coloration: Skin is not pale.      Findings: No erythema or rash.   Neurological:      Mental Status: She is alert and oriented to person, place, and time.      Cranial Nerves: No cranial nerve deficit.      Motor: No abnormal muscle tone.      Coordination: Coordination normal.      Deep Tendon Reflexes: Reflexes are normal and symmetric. Reflexes normal.   Psychiatric:         Behavior: Behavior normal.         Thought Content: Thought content normal.         Judgment: Judgment normal.         Assessment:     1. Family history of aortic dissection    2. 18 weeks gestation of pregnancy        Plan:     Lucy already has a cardiologist in [facility] planing to deliver, No precautionary advise at this time, we discussed sx of dissection in pregnancy. Her echo reviewed from outside is nornal and her aortic dimensions are normal as well.   Ade was seen today for family history of aortic dissection.    Diagnoses and all orders for this visit:    Family history of aortic dissection    18 weeks gestation of pregnancy

## 2024-04-19 NOTE — PROGRESS NOTES
Maternal Fetal Medicine follow up consult    SUBJECTIVE:     Ade Simms is a 28 y.o.  female with IUP at 17w6d  who is seen in follow up consultation by MFM.  Pregnancy complications include:   Problem   1. Hereditary disease in family possibly affecting fetus, fetus 1       Previous notes reviewed.   No changes to medical, surgical, family, social, or obstetric history.    Interval history since last M visit: no changes. Feels well.    Medications:  Metoprolol   ASA  PNV     OBJECTIVE:     Blood Pressure: 118/74    Ultrasound performed. See viewpoint for full ultrasound report.  A detailed fetal anatomic ultrasound examination was performed.  No fetal structural malformations are identified; however, fetal imaging is incomplete today.   Fetal size today is consistent with established gestational age.   Cervical length by TA scanning is normal.   Placental location is anterior without evidence of previa.     ASSESSMENT/PLAN:     28 y.o.  female with IUP at 17w6d     Problems addressed at today's visit:  1. Hereditary disease in family possibly affecting fetus, fetus 1  Diagnosis: ACTA-2 pathogenic variant, FTAAD-causing mutation. No personal history of dissection or acute vascular event.  Family history: father and sister have both had aortic dissections  Medications: metoprolol, ASA  Echocardiogram (2024): EF 60%, aortic root 3.0cm  Cardiologist: [facility]. Seeing [facility] cardiologist today.   Seeing OB anesthesia today.    See note by Dr. Samayoa for previous consultation.  ACTA-2 is inherited in an autosomal dominant fashion. It is in the FTAAD class and is associated with a 20% risk of aortic dissection in or soon after pregnancy; it is also associated with a risk of MI. These risks are highest in the end of the 3rd trimester and in the postpartum period.  There has been some discussion about the ideal location for the patient to receive pregnancy care and for delivery.    In the non-pregnant population, aortic root/ascending aorta replacement is recommended if either of these measurements are >4.2cm. We reviewed that there may not be a measurement in which there is no risk for aortic dissection.   The patient has no concerning cardiac symptoms.  The patient declines amniocentesis for fetal assessment for ACTA-2 pathogenic variant.      Recommendations  - Continue tight BP control with goal <120/80. Continue metoprolol and ASA.   - will f/u cardiology and OB anesthesia recommendations. Continue close follow up with cardiology. She is scheduled to see CT surgery in [place] in May.   - the patient should undergo serial aortic root and ascending aorta measurements throughout pregnancy. Echocardiogram and/or cardiac MRI are appropriate.   - Fetal growth assessment q4 weeks. These can occur in [place].  - Antepartum surveillance 2x weekly at 32 weeks.   - Delivery at 37 weeks   - If aortic root and ascending aorta measurements remain <4cm throughout pregnancy, I think it is appropriate for the patient to deliver in [place].  is appropriate in these circumstances, consider assisted 2nd stage to decrease amount of time with Valsalva. If there is rapid growth or if the aortic root measures >4cm in the third trimester, recommend delivery via  and this should likely occur at [facility] in conjunction with OB and Cardiac anesthesia and with CT surgery available in the event dissection or acute event occurs.  - Telemetry in labor. Consider ICU admission postpartum.  - If PPH: avoid methergine. Would use misoprostol and TXA as first line agents.   - recommend  genetic assessment in the  for ACTA-2 mutation    Please see original [facility] consultation for full details regarding management recommendations of these and other obstetric co-morbidities    Note: If there is ever any concern for aortic dissection, the preferred imaging modality is an emergent contrast CT  angiogram, regardless of pregnancy status or gestational age. This should not be withheld in the face of life-threatening maternal status and counseling need not be performed by an Ob/Gyn or MFM prior to performing this imaging study if it is indicated by maternal signs or symptoms.     FOLLOW UP:   The patient can follow up monthly with M in [place]. If aortic root rapidly enlarges or if measurement >4cm, will make plans as noted above for delivery. We will continue to follow her chart from afar but please contact our MFM office if there are any concerns or questions regarding the care of this patient.     Today I have spent 45 minutes in the care of the patient. This includes face to face time and non-face to face time preparing to see the patient (eg, review of tests), obtaining and/or reviewing separately obtained history, documenting clinical information in the electronic or other health record, independently interpreting results and communicating results to the patient/family/caregiver, or care coordination.     Shannon Garcia MD  Maternal Fetal Medicine

## 2024-04-19 NOTE — ASSESSMENT & PLAN NOTE
Diagnosis: ACTA-2 pathogenic variant, FTAAD-causing mutation. No personal history of dissection or acute vascular event.  Family history: father and sister have both had aortic dissections  Medications: metoprolol, ASA  Echocardiogram (2024): EF 60%, aortic root 3.0cm  Cardiologist: [facility]. Seeing [facility] cardiologist today.   Seeing OB anesthesia today.    See note by Dr. Samayoa for previous consultation.  ACTA-2 is inherited in an autosomal dominant fashion. It is in the FTAAD class and is associated with a 20% risk of aortic dissection in or soon after pregnancy; it is also associated with a risk of MI. These risks are highest in the end of the 3rd trimester and in the postpartum period.  There has been some discussion about the ideal location for the patient to receive pregnancy care and for delivery.   In the non-pregnant population, aortic root/ascending aorta replacement is recommended if either of these measurements are >4.2cm. We reviewed that there may not be a measurement in which there is no risk for aortic dissection.   The patient has no concerning cardiac symptoms.  The patient declines amniocentesis for fetal assessment for ACTA-2 pathogenic variant.      Recommendations  - Continue tight BP control with goal <120/80. Continue metoprolol and ASA.   - will f/u cardiology and OB anesthesia recommendations. Continue close follow up with cardiology. She is scheduled to see CT surgery in [place] in May.   - the patient should undergo serial aortic root and ascending aorta measurements throughout pregnancy. Echocardiogram and/or cardiac MRI are appropriate.   - Fetal growth assessment q4 weeks. These can occur in [place].  - Antepartum surveillance 2x weekly at 32 weeks.   - Delivery at 37 weeks   - If aortic root and ascending aorta measurements remain <4cm throughout pregnancy, I think it is appropriate for the patient to deliver in [place].  is appropriate in these  circumstances, consider assisted 2nd stage to decrease amount of time with Valsalva. If there is rapid growth or if the aortic root measures >4cm in the third trimester, recommend delivery via  and this should likely occur at [facility] in conjunction with OB and Cardiac anesthesia and with CT surgery available in the event dissection or acute event occurs.  - Telemetry in labor. Consider ICU admission postpartum.  - If PPH: avoid methergine. Would use misoprostol and TXA as first line agents.   - recommend  genetic assessment in the  for ACTA-2 mutation

## 2024-04-19 NOTE — CONSULTS
Consults    Outpatient OB Anesthesia Consult      Date and Time: 2024 2:42 PM     Request from: Dr. Samayoa    CC requesting physician or midwife: Yes    Reason for Consult: Anesthetic recommendations for delivery    Initial Consult?: Yes    Chief Complaint: Family hx of genetic disease     HPI: Patient is a 28 y.o. year old woman,  at 17w6d presenting with a history of ACTA2 pathogenic variant. Patient has no medical problems or manifestations of this genetic variant.   Family history is significant for her father and sister who have had aortic dissections. Her sister has had three pregnancies with mild aortic enlargement during her last pregnancy. She follows with a cardiologist in [place].    Past medical history:    Past Medical History:   Diagnosis Date    Family history of genetic disease     Dad has tested positive for: familial thoracic aortic aneurysm and dissection (FTAAD)       Past surgical history:    Past Surgical History:   Procedure Laterality Date    CHOLECYSTECTOMY      DILATION AND CURETTAGE OF UTERUS         Family history:    Family History   Problem Relation Name Age of Onset    Aortic dissection Father      Aortic dissection Sister         Social History:    Social History     Socioeconomic History    Marital status:    Occupational History    Occupation: O[employer]   Tobacco Use    Smoking status: Never    Smokeless tobacco: Never   Substance and Sexual Activity    Alcohol use: Not Currently    Drug use: Never    Sexual activity: Yes     Partners: Male       Medication:    Current Outpatient Medications on File Prior to Visit   Medication Sig Dispense Refill    metoprolol succinate (TOPROL-XL) 25 MG 24 hr tablet Take 25 mg by mouth once daily.      ondansetron (ZOFRAN-ODT) 4 MG TbDL Take 1 tablet (4 mg total) by mouth every 6 (six) hours. (Patient taking differently: Take 4 mg by mouth as needed.) 12 tablet 4     No current facility-administered medications on file prior to visit.        Allergies:    Patient has no known allergies.    Family or personal history of anesthetic complications: No    Diagnostic Studies: I have reviewed the following relevant findings as noted below:  CBC:  Lab Results   Component Value Date    WBC 11.28 03/12/2024    HGB 13.6 03/12/2024    HCT 37.1 03/12/2024    MCV 85.9 03/12/2024     03/12/2024      CMP:  Sodium Level   Date Value Ref Range Status   12/01/2023 140 136 - 145 mmol/L Final     Potassium Level   Date Value Ref Range Status   12/01/2023 4.1 3.5 - 5.1 mmol/L Final     Carbon Dioxide   Date Value Ref Range Status   12/01/2023 26 22 - 29 mmol/L Final     Blood Urea Nitrogen   Date Value Ref Range Status   12/01/2023 10.5 7.0 - 18.7 mg/dL Final     Creatinine   Date Value Ref Range Status   12/01/2023 0.87 0.55 - 1.02 mg/dL Final     Calcium Level Total   Date Value Ref Range Status   12/01/2023 9.3 8.4 - 10.2 mg/dL Final     Albumin Level   Date Value Ref Range Status   12/01/2023 4.3 3.5 - 5.0 g/dL Final     Bilirubin Total   Date Value Ref Range Status   12/01/2023 1.3 <=1.5 mg/dL Final     Alkaline Phosphatase   Date Value Ref Range Status   12/01/2023 63 40 - 150 unit/L Final     Aspartate Aminotransferase   Date Value Ref Range Status   12/01/2023 30 5 - 34 unit/L Final     Alanine Aminotransferase   Date Value Ref Range Status   12/01/2023 27 0 - 55 unit/L Final     BMP:   Lab Results   Component Value Date     12/01/2023    K 4.1 12/01/2023    CO2 26 12/01/2023    BUN 10.5 12/01/2023    CREATININE 0.87 12/01/2023    CALCIUM 9.3 12/01/2023     TTE: Normal biventricular function. Aortic root 3    Review of Systems:   Constitutional: Negative for fever and chills; well appearing female  Eyes: no visual changes  Ear, nose, mouth and throat: no loose or broken teeth  Cardiovascular: no chest pain  Respiratory: no shortness of breath  Gastrointestinal: no nausea or vomiting  Genitourinary: no dysuria  Musculoskeletal: no joint pain  Skin:  warm and dry, no rashes  Neurologic: no seizures  Psychiatric: no anxiety or depression  Endocrinology: no heat or cold intolerance  Hematologic: does not bruise or bleed easily      Physical Exam:  Vitals:    See M Visit vitals  Constitutional: alert, no distress  Eyes: normal lids, pupils symmetric  Ear, nose, mouth and throat: Mallampati I, normal thyromental distance, normal lips, teeth, gums and tongue   Cardiovascular: normal rate, no murmurs  Respiratory: normal effort, no wheezes  Musculoskeletal: normal gait, normal range of motion  Skin: no rashes, no induration  Neurologic: normal reflexes and sensation  Psychiatric: oriented to person, place, time; normal affect    ASA Score: 3    Assessment and Plan:  28 year old  at 17w6d with a hx of ACTA2 genetic variant and strong family history of aortic dissection. At the time of my evaluation, patient denies cardiopulmonary symptoms and TTE on 2024 showed normal aortic structure.     - Mode of delivery remains to be determined [vaginal delivery vs primary C/S] however the patient is stable without any concern for aortic disease.   - Pt has a repeat echocardiogram, an MRI and a visit with CT surgery pending. We will follow up on these studies and office visits. She desires to delivery in [place] which can be achieved should her pregnancy remain uneventful with tight blood pressure and heart rate control. She is currently on metoprolol which is expected to be continued.  - In the event that her studies/imaging shows concern for an enlarging aortic root and there is evidence of uncontrolled hypertension the recommendation is for her to deliver at [facility]. At that facility she would have quicker access to cardiac surgeons if intervention is necessary.     - Consider telemetry while in labor and ICU monitoring post partum   - (+/-) arterial line placement for close hemodynamic monitoring however this decision can be made once the above pending  studies are completed.   - Early epidural placement is recommended to have good analgesia during labor to minimize sympathetic surges that may occur with the pain of contractions.     Complexity: Moderate    Entertained and answered all questions to the patient's satisfaction.      Alen Carter MD

## 2024-04-23 DIAGNOSIS — O35.2XX1 HEREDITARY DISEASE IN FAMILY POSSIBLY AFFECTING FETUS, FETUS 1: Primary | ICD-10-CM

## 2024-04-23 LAB
OHS QRS DURATION: 78 MS
OHS QTC CALCULATION: 406 MS

## 2024-04-24 ENCOUNTER — DOCUMENTATION ONLY (OUTPATIENT)
Dept: MATERNAL FETAL MEDICINE | Facility: CLINIC | Age: 29
End: 2024-04-24
Payer: COMMERCIAL

## 2024-04-24 NOTE — PROGRESS NOTES
Plan of care discussed at [facility] Maternal Conference.     After multidisciplinary input, patient okay to be delivered in [place] pending pregnancy progression. Has been evlauated by Cardiology (Dr. Greco) and OB Anesthesia.     Delivery recommendations    Location TBD; 37 weeks -> if aortic root <4 cm and clinically stable, patient to deliver in [place]  May consider vaginal birth with early epidural and assisted second stage  Avoid methergine  Consider ICU post partum with telemetry    If at any point evidence of worsening cardiac function, aortic root dilation, or other cardiac decompensation, patient may need delivery via primary CD at [facility] with Cardiac Anesthesia.     Ashley Meier MD  PGY 6  Maternal Fetal Medicine  [facility]

## 2024-05-01 ENCOUNTER — OFFICE VISIT (OUTPATIENT)
Dept: CARDIAC SURGERY | Facility: CLINIC | Age: 29
End: 2024-05-01
Payer: COMMERCIAL

## 2024-05-01 VITALS
HEIGHT: 69 IN | RESPIRATION RATE: 16 BRPM | SYSTOLIC BLOOD PRESSURE: 117 MMHG | DIASTOLIC BLOOD PRESSURE: 78 MMHG | HEART RATE: 89 BPM | WEIGHT: 226 LBS | OXYGEN SATURATION: 99 % | BODY MASS INDEX: 33.47 KG/M2

## 2024-05-01 DIAGNOSIS — O35.2XX1 HEREDITARY DISEASE IN FAMILY POSSIBLY AFFECTING FETUS, FETUS 1: Primary | ICD-10-CM

## 2024-05-01 PROCEDURE — 1159F MED LIST DOCD IN RCRD: CPT | Mod: CPTII,,, | Performed by: THORACIC SURGERY (CARDIOTHORACIC VASCULAR SURGERY)

## 2024-05-01 PROCEDURE — 3078F DIAST BP <80 MM HG: CPT | Mod: CPTII,,, | Performed by: THORACIC SURGERY (CARDIOTHORACIC VASCULAR SURGERY)

## 2024-05-01 PROCEDURE — 3074F SYST BP LT 130 MM HG: CPT | Mod: CPTII,,, | Performed by: THORACIC SURGERY (CARDIOTHORACIC VASCULAR SURGERY)

## 2024-05-01 PROCEDURE — 1160F RVW MEDS BY RX/DR IN RCRD: CPT | Mod: CPTII,,, | Performed by: THORACIC SURGERY (CARDIOTHORACIC VASCULAR SURGERY)

## 2024-05-01 PROCEDURE — 3008F BODY MASS INDEX DOCD: CPT | Mod: CPTII,,, | Performed by: THORACIC SURGERY (CARDIOTHORACIC VASCULAR SURGERY)

## 2024-05-01 PROCEDURE — 99204 OFFICE O/P NEW MOD 45 MIN: CPT | Mod: ,,, | Performed by: THORACIC SURGERY (CARDIOTHORACIC VASCULAR SURGERY)

## 2024-05-01 NOTE — PROGRESS NOTES
History & Physical    SUBJECTIVE:     History of Present Illness:  The patient is presenting to be established for follow-ups for ACTA 2 gene mutation.  She has a very strong family history.  Her last CT showed an aortic root of 3 ascending arch of 3 and descending abdominal aorta of normal size.  She is currently pregnant      Chief Complaint   Patient presents with    Pre-op Exam     REFERRAL-DR. FLANAGAN-ACTA2 GENE MUTATION, FAMILY HX AORTIC ANEURYSM. AORTIC ROOT 3.0, ASCENDING ARCH 3.0, DESCENDING ABD AORTA NORMAL SIZE. PT IS PREGNANT, DYSPNEA.         Review of patient's allergies indicates:  No Known Allergies    Current Outpatient Medications   Medication Sig Dispense Refill    aspirin 81 MG Chew Take 81 mg by mouth once daily.      metoprolol succinate (TOPROL-XL) 25 MG 24 hr tablet Take 25 mg by mouth once daily.      ondansetron (ZOFRAN-ODT) 4 MG TbDL Take 1 tablet (4 mg total) by mouth every 6 (six) hours. (Patient taking differently: Take 4 mg by mouth as needed.) 12 tablet 4     No current facility-administered medications for this visit.       Past Medical History:   Diagnosis Date    Family history of genetic disease     Dad has tested positive for: familial thoracic aortic aneurysm and dissection (FTAAD)     Past Surgical History:   Procedure Laterality Date    CHOLECYSTECTOMY      DILATION AND CURETTAGE OF UTERUS       Family History   Problem Relation Name Age of Onset    Aortic dissection Father      Aortic dissection Sister       Social History     Tobacco Use    Smoking status: Never    Smokeless tobacco: Never   Substance Use Topics    Alcohol use: Not Currently    Drug use: Never        Review of Systems:  Review of Systems   Constitutional: Negative.    HENT: Negative.     Eyes: Negative.    Respiratory: Negative.     Cardiovascular: Negative.    Gastrointestinal: Negative.    Endocrine: Negative.    Genitourinary: Negative.    Musculoskeletal: Negative.         Claudications   Skin: Negative.  "   Allergic/Immunologic: Negative.    Neurological: Negative.    Hematological: Negative.    Psychiatric/Behavioral: Negative.         OBJECTIVE:     Vital Signs (Most Recent)  Pulse: 89 (05/01/24 0949)  Resp: 16 (05/01/24 0949)  BP: 117/78 (05/01/24 0949)  SpO2: 99 % (05/01/24 0949)  5' 9" (1.753 m)  102.5 kg (226 lb)     Physical Exam:  Physical Exam  Vitals reviewed.   Constitutional:       Appearance: Normal appearance.   HENT:      Head: Normocephalic and atraumatic.      Nose: Nose normal.      Mouth/Throat:      Mouth: Mucous membranes are dry.      Pharynx: Oropharynx is clear.   Eyes:      Extraocular Movements: Extraocular movements intact.      Conjunctiva/sclera: Conjunctivae normal.      Pupils: Pupils are equal, round, and reactive to light.   Cardiovascular:      Rate and Rhythm: Normal rate and regular rhythm.      Pulses: Normal pulses.   Pulmonary:      Effort: Pulmonary effort is normal.      Breath sounds: Normal breath sounds.   Abdominal:      General: Abdomen is flat.      Palpations: Abdomen is soft.   Musculoskeletal:         General: Normal range of motion.      Cervical back: Neck supple.   Skin:     General: Skin is warm and dry.   Neurological:      General: No focal deficit present.   Psychiatric:         Mood and Affect: Mood normal.         Laboratory:  None      Diagnostic Results:  None      ASSESSMENT/PLAN:     The patient will benefit from a yearly follow-up for aneurysmal disease.  She is 20 weeks pregnant now.  I plan to obtain an echocardiogram.  I will set her up with a CTA of the chest abdomen and pelvis around the December January to establish a baseline.  The patient was reassured                "

## 2024-05-15 ENCOUNTER — OFFICE VISIT (OUTPATIENT)
Dept: PEDIATRIC CARDIOLOGY | Facility: CLINIC | Age: 29
End: 2024-05-15
Payer: COMMERCIAL

## 2024-05-15 ENCOUNTER — CLINICAL SUPPORT (OUTPATIENT)
Dept: PEDIATRIC CARDIOLOGY | Facility: CLINIC | Age: 29
End: 2024-05-15
Payer: COMMERCIAL

## 2024-05-15 VITALS
HEART RATE: 108 BPM | DIASTOLIC BLOOD PRESSURE: 79 MMHG | HEIGHT: 69 IN | SYSTOLIC BLOOD PRESSURE: 112 MMHG | WEIGHT: 228.38 LBS | BODY MASS INDEX: 33.83 KG/M2

## 2024-05-15 DIAGNOSIS — O35.2XX1 HEREDITARY DISEASE IN FAMILY POSSIBLY AFFECTING FETUS, FETUS 1: Primary | ICD-10-CM

## 2024-05-15 DIAGNOSIS — O35.2XX1 HEREDITARY DISEASE IN FAMILY POSSIBLY AFFECTING FETUS, FETUS 1: ICD-10-CM

## 2024-05-15 PROCEDURE — 1159F MED LIST DOCD IN RCRD: CPT | Mod: CPTII,S$GLB,, | Performed by: PEDIATRICS

## 2024-05-15 PROCEDURE — 76827 ECHO EXAM OF FETAL HEART: CPT | Mod: S$GLB,,, | Performed by: PEDIATRICS

## 2024-05-15 PROCEDURE — 3074F SYST BP LT 130 MM HG: CPT | Mod: CPTII,S$GLB,, | Performed by: PEDIATRICS

## 2024-05-15 PROCEDURE — 3078F DIAST BP <80 MM HG: CPT | Mod: CPTII,S$GLB,, | Performed by: PEDIATRICS

## 2024-05-15 PROCEDURE — 3008F BODY MASS INDEX DOCD: CPT | Mod: CPTII,S$GLB,, | Performed by: PEDIATRICS

## 2024-05-15 PROCEDURE — 76825 ECHO EXAM OF FETAL HEART: CPT | Mod: S$GLB,,, | Performed by: PEDIATRICS

## 2024-05-15 PROCEDURE — 99203 OFFICE O/P NEW LOW 30 MIN: CPT | Mod: 25,S$GLB,, | Performed by: PEDIATRICS

## 2024-05-15 PROCEDURE — 93325 DOPPLER ECHO COLOR FLOW MAPG: CPT | Mod: S$GLB,,, | Performed by: PEDIATRICS

## 2024-05-15 NOTE — PROGRESS NOTES
[facility] Pediatric Cardiology Fetal Cardiology Clinic    Today, I had the pleasure of evaluating Ade Simms who is now 28 y.o. and carrying her second pregnancy at 21 4/7 weeks gestation with an NELI of 24. 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  Her cardiac evaluation thus far has been reassuring with no significant aortic dilation.     She is carrying a male fetus, yet unnamed.  Pregnancy thus far has been otherwise uncomplicated.     Obstetric History:    .  Her OB history is notable for prior early SAB.     Past Medical History:   Diagnosis Date    Family history of genetic disease     Dad has tested positive for: familial thoracic aortic aneurysm and dissection (FTAAD)         Current Outpatient Medications:     aspirin 81 MG Chew, Take 81 mg by mouth once daily., Disp: , Rfl:     metoprolol succinate (TOPROL-XL) 25 MG 24 hr tablet, Take 25 mg by mouth once daily., Disp: , Rfl:     ondansetron (ZOFRAN-ODT) 4 MG TbDL, Take 1 tablet (4 mg total) by mouth every 6 (six) hours. (Patient taking differently: Take 4 mg by mouth as needed.), Disp: 12 tablet, Rfl: 4     Review of patient's allergies indicates:  No Known Allergies    Family History: 's father has a history of aortic dissection and is gene positive fo ATCA2 gene, she and her sister also carry the gene, but thus far are asymptomatic.    Social History: Ms. Ade Simms is  to the father of the baby.  The father of the baby is involved.     FETAL ECHOCARDIOGRAM (summary):  Fetal echocardiogram at 21 4/7wga, NELI 24  Normal fetal echocardiogram  (Full report in electronic medical record)    Impression:  Single active male fetus at 21 4/7wga wga.  Normal fetal echocardiogram.      Todays fetal echocardiogram is normal, within the limitations of fetal echocardiography.  I discussed with  her that fetal echocardiography is insufficiently sensitive to rule out all septal defects, anomalies of pulmonary and systemic veins, arch anomalies, and some valvar abnormalities, nor can it ensure that the ductus arteriosus and foramen ovale will spontaneously close.     Recommendations:  Location, timing, and mode of delivery will be determined by the obstetrical team.  She does not require further follow-up in the fetal echocardiography clinic, but I would be happy to see her again if additional questions or concerns arise.    Should there be any concerns about the baby's heart after birth, a post- echocardiogram and cardiology consultation are recommended.     At some point, the child should be tested for the ACTA2 gene as well. Consider referral of infant/child to cardiogenetics clinic.             Flori Nguyen MD, MSCI  Pediatric Cardiology  Pediatric Echocardiography, Fetal Echocardiography, Cardiac MRI  [facility] [address] [place]  [address]  Phone [phone], Fax [phone]        The above information was discussed in detail including the use of diagrams, with 30 minutes of total face to face time, with greater than 50% with counseling and coordination of care.  The discussion of the diagnosis and treatment options is as described above.

## 2024-05-21 DIAGNOSIS — O35.2XX1 HEREDITARY DISEASE IN FAMILY POSSIBLY AFFECTING FETUS, FETUS 1: Primary | ICD-10-CM

## 2024-05-22 ENCOUNTER — PROCEDURE VISIT (OUTPATIENT)
Dept: MATERNAL FETAL MEDICINE | Facility: CLINIC | Age: 29
End: 2024-05-22
Payer: COMMERCIAL

## 2024-05-22 ENCOUNTER — OFFICE VISIT (OUTPATIENT)
Dept: MATERNAL FETAL MEDICINE | Facility: CLINIC | Age: 29
End: 2024-05-22
Payer: COMMERCIAL

## 2024-05-22 VITALS
WEIGHT: 233 LBS | BODY MASS INDEX: 34.51 KG/M2 | HEIGHT: 69 IN | HEART RATE: 120 BPM | SYSTOLIC BLOOD PRESSURE: 116 MMHG | DIASTOLIC BLOOD PRESSURE: 79 MMHG

## 2024-05-22 DIAGNOSIS — K21.9 GASTROESOPHAGEAL REFLUX DURING PREGNANCY IN SECOND TRIMESTER, ANTEPARTUM: ICD-10-CM

## 2024-05-22 DIAGNOSIS — O35.2XX1 HEREDITARY DISEASE IN FAMILY POSSIBLY AFFECTING FETUS, FETUS 1: ICD-10-CM

## 2024-05-22 DIAGNOSIS — O35.2XX1 HEREDITARY DISEASE IN FAMILY POSSIBLY AFFECTING FETUS, FETUS 1: Primary | ICD-10-CM

## 2024-05-22 DIAGNOSIS — O35.EXX0 PYELECTASIS OF FETUS ON PRENATAL ULTRASOUND: ICD-10-CM

## 2024-05-22 DIAGNOSIS — O99.612 GASTROESOPHAGEAL REFLUX DURING PREGNANCY IN SECOND TRIMESTER, ANTEPARTUM: ICD-10-CM

## 2024-05-22 PROCEDURE — 3074F SYST BP LT 130 MM HG: CPT | Mod: CPTII,S$GLB,, | Performed by: OBSTETRICS & GYNECOLOGY

## 2024-05-22 PROCEDURE — 3008F BODY MASS INDEX DOCD: CPT | Mod: CPTII,S$GLB,, | Performed by: OBSTETRICS & GYNECOLOGY

## 2024-05-22 PROCEDURE — 3078F DIAST BP <80 MM HG: CPT | Mod: CPTII,S$GLB,, | Performed by: OBSTETRICS & GYNECOLOGY

## 2024-05-22 PROCEDURE — 76816 OB US FOLLOW-UP PER FETUS: CPT | Mod: S$GLB,,, | Performed by: OBSTETRICS & GYNECOLOGY

## 2024-05-22 PROCEDURE — 1160F RVW MEDS BY RX/DR IN RCRD: CPT | Mod: CPTII,S$GLB,, | Performed by: OBSTETRICS & GYNECOLOGY

## 2024-05-22 PROCEDURE — 99214 OFFICE O/P EST MOD 30 MIN: CPT | Mod: S$GLB,,, | Performed by: OBSTETRICS & GYNECOLOGY

## 2024-05-22 PROCEDURE — 1159F MED LIST DOCD IN RCRD: CPT | Mod: CPTII,S$GLB,, | Performed by: OBSTETRICS & GYNECOLOGY

## 2024-05-22 RX ORDER — OMEPRAZOLE 20 MG/1
20 CAPSULE, DELAYED RELEASE ORAL 2 TIMES DAILY
Qty: 60 CAPSULE | Refills: 3 | Status: SHIPPED | OUTPATIENT
Start: 2024-05-22

## 2024-05-22 NOTE — PROGRESS NOTES
"Maternal Fetal Medicine follow up consult    SUBJECTIVE:     Ade Simms is a 28 y.o.  female with IUP at 22w4d who is seen in follow up consultation by M.  Pregnancy complications include:   Problem   - Pyelectasis of fetus on prenatal ultrasound   - Gastroesophageal reflux during pregnancy in second trimester, antepartum   1. Hereditary disease in family possibly affecting fetus, fetus 1     Ade presents for routine follow up appointment.  She has completed consultations at [facility] with anesthesia, cardiology, and MFM. Fetal echo also completed. Consult w/ Dr Soto in [facility] completed with repeat imaging. Her aortic root was measured at 3cm (stable). She is feeling well.    She has complaints of persistent cough and upon discussion, likely GERD.  Denies LOF, VB, contractions. Positive fetal movement.    Previous notes reviewed.   No changes to medical, surgical, family, social, or obstetric history.    Interval history since last [facility] visit: see above    Medications reviewed.    Care team members:  Dr Aly - Primary OB     OBJECTIVE:   /79 (BP Location: Right arm)   Pulse (!) 120   Ht 5' 9" (1.753 m)   Wt 105.7 kg (233 lb 0.4 oz)   BMI 34.41 kg/m²     Ultrasound performed. See viewpoint for full ultrasound report.    A viable doan pregnancy is visualized in breech presentation.  Estimated fetal weight is at the 56th percentile with an abdominal circumference at the 85th percentile.    New left renal pelvis dilation is noted measuring 4.8mm (UTDA1) and anatomic survey is complete. Amniotic fluid volume is normal.  Placenta is anterior.      ASSESSMENT/PLAN:     28 y.o.  female with IUP at 22w4d    1. 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 Her father and her sister also have this gene mutation. Her father had an aortic dissection at the age of 50. Her sister " had a complicated type B dissection at the age of 26 and survived. Her sister had three successful pregnancies.   She's been followed by a cardiologist at the [facility]. She had normal aortic imaging approx 6-7 yrs ago. Her last evaluation was 1/31/24 where echo results were normal (Aortic root 3.0; EF 60%).     Mutations in ACTA2 (codes for smooth muscle specific alpha-actin, a critical component of the contractile apparatus of the vascular smooth muscle cell) predispose to thoracic aortic aneurysms and dissection as well as coronary artery and cerebrovascular disease. In one case control study, 53 women with this mutation were followed in 137 pregnancies.  eight had aortic dissections in the third trimester or the postpartum period (6% of pregnancies). One woman also had a myocardial infarct that occurred during pregnancy that was independent of her aortic dissection. Compared to the population-based frequency of peripartum aortic dissections of 0.6%, the rate of peripartum aortic dissections in women with ACTA2 mutations is much higher (8 out of 39; 20%). Six of these dissections initiated in the ascending aorta ([place] type A), three were fatal. Three women had ascending aortic dissections at diameters less that 5.0?cm (range 3.8-4.7?cm). Aortic pathology showed mild to moderate medial degeneration of the aorta in three women. Of note, five of the women had hypertension either during or before the pregnancy. In summary, the majority of women with ACTA2 mutations did not have aortic or other vascular complications with pregnancy. However, these findings show that pregnancy is associated with significant risk for aortic dissection in women with ACTA2 mutations. Women with ACTA2 mutations who are planning to get pregnant should be counseled about this risk of aortic dissection, and proper clinical management should be initiated to reduce this risk.  Other associated features of this mutation may be  ocular abnormalities (iris flocculi) and skin manifestations (livedo reticularis).     When managing this disease in pregnancy, guidelines are limited and extrapolated on other aortopathy syndromes. Some guidance exists ( https://www.obstetanesthesia.com/article/-654Z(87)13355-4/fulltext) and includes the following considerations:     Complications are most probable in the third trimester and postpartum periods. High vigilance for any changes in cardiovascular or neurologic status. She has been counseled  -Delivery planning: Vaginal birth can be considered with early epidural to limit hemodynamic shifts/ increased cardiac output, and assisted second stage.   -Postpartum- Consider ICU monitoring for 24 hrs with telemetry as this is the highest risk period. At risk for atony/ postpartum hemorrhage; complete high risk protocols with T&C, IV access, avoid methergine and hemabate if able due to smooth muscle effects. Use primarily fundal massage and cytotec. Use TXA.  Breastfeeding has shown adverse issues in animals but not in humans and is permitted.    24- Fetal echo completed and normal. Consult w/ anesthesia, cards, and MFM completed at [facility]. Recently had consult w/ Dr Soto here in [facility]. states root diameter was normal    Recommendations  - Recommend beta blockade during pregnancy. Continue Metoprolol and LDA; home BP monitoring discussed. Tight BP control with goal of 120/80.  - Close cardiology follow up- the patient should undergo serial aortic root and ascending aorta measurements throughout pregnancy. Echocardiogram and/or cardiac MRI are appropriate.   - If aortic root and ascending aorta measurements remain <4cm throughout pregnancy, I think it is appropriate for the patient to deliver in [place]. Telemetry in labor. Consider ICU admission postpartum.  is appropriate in these circumstances, consider assisted 2nd stage to decrease amount of time with Valsalva. If there is rapid growth or if the  aortic root measures >4cm in the third trimester, recommend delivery via  and this should likely occur at [facility] in conjunction with OB and Cardiac anesthesia and with CT surgery available in the event dissection or acute event occurs.  - Strict precautions given  - ASA 81mg recommended  - Fetal growth assessment q4 weeks w/ MFM  - Antepartum surveillance 2x weekly at 32 weeks.   - Delivery at 37 weeks                - Pyelectasis of fetus on prenatal ultrasound  Mild left sided renal pelvic dilation measuring 4.8mm was noted consistent with UTDA1. The kidneys appear normal as does the bladder.   The finding of mild renal pelvis dilation was discussed with the patient. We reviewed basic anatomy of the renal collecting system and then discussed possible etiologies for renal pelvis dilation. When dilation of the renal pelvis is borderline or mild, most cases will resolve spontaneously. However, if the renal dilation persists in the  period, the most common conditions that can cause renal pelvis dilation are UPJ obstruction (ureteropelvic junction), UVJ obstruction (ureterovesical junction) and VUR (vesicoureteral reflux). These conditions predispose infants/children to urinary tract infection, but can be effectively followed and treated. I explained that the vast majority of infants with these findings will have spontaneous resolution during pregnancy. We will plan to re-evaluate the kidneys at her next office visit.       - Gastroesophageal reflux during pregnancy in second trimester, antepartum  She reports a persistent cough with symptoms of esophageal burning worse in the AM and night. We have discussed GERD in pregnancy. Explained dietary modifications as well as medication management. She desires Omeprazole. Rx sent      F/u in 4 weeks for MFM visit /growth ultrasound    Meli Samayoa MD  Maternal Fetal Medicine

## 2024-05-22 NOTE — ASSESSMENT & PLAN NOTE
Mild left sided renal pelvic dilation measuring 4.8mm was noted consistent with UTDA1. The kidneys appear normal as does the bladder.   The finding of mild renal pelvis dilation was discussed with the patient. We reviewed basic anatomy of the renal collecting system and then discussed possible etiologies for renal pelvis dilation. When dilation of the renal pelvis is borderline or mild, most cases will resolve spontaneously. However, if the renal dilation persists in the  period, the most common conditions that can cause renal pelvis dilation are UPJ obstruction (ureteropelvic junction), UVJ obstruction (ureterovesical junction) and VUR (vesicoureteral reflux). These conditions predispose infants/children to urinary tract infection, but can be effectively followed and treated. I explained that the vast majority of infants with these findings will have spontaneous resolution during pregnancy. We will plan to re-evaluate the kidneys at her next office visit.

## 2024-05-22 NOTE — ASSESSMENT & PLAN NOTE
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 Her father and her sister also have this gene mutation. Her father had an aortic dissection at the age of 50. Her sister had a complicated type B dissection at the age of 26 and survived. Her sister had three successful pregnancies.   She's been followed by a cardiologist at the [facility]. She had normal aortic imaging approx 6-7 yrs ago. Her last evaluation was 1/31/24 where echo results were normal (Aortic root 3.0; EF 60%).     Mutations in ACTA2 (codes for smooth muscle specific alpha-actin, a critical component of the contractile apparatus of the vascular smooth muscle cell) predispose to thoracic aortic aneurysms and dissection as well as coronary artery and cerebrovascular disease. In one case control study, 53 women with this mutation were followed in 137 pregnancies.  eight had aortic dissections in the third trimester or the postpartum period (6% of pregnancies). One woman also had a myocardial infarct that occurred during pregnancy that was independent of her aortic dissection. Compared to the population-based frequency of peripartum aortic dissections of 0.6%, the rate of peripartum aortic dissections in women with ACTA2 mutations is much higher (8 out of 39; 20%). Six of these dissections initiated in the ascending aorta ([place] type A), three were fatal. Three women had ascending aortic dissections at diameters less that 5.0?cm (range 3.8-4.7?cm). Aortic pathology showed mild to moderate medial degeneration of the aorta in three women. Of note, five of the women had hypertension either during or before the pregnancy. In summary, the majority of women with ACTA2 mutations did not have aortic or other vascular complications with pregnancy. However, these findings show that pregnancy is associated with significant risk for aortic dissection in women with ACTA2 mutations. Women with ACTA2 mutations who are  planning to get pregnant should be counseled about this risk of aortic dissection, and proper clinical management should be initiated to reduce this risk.  Other associated features of this mutation may be ocular abnormalities (iris flocculi) and skin manifestations (livedo reticularis).     When managing this disease in pregnancy, guidelines are limited and extrapolated on other aortopathy syndromes. Some guidance exists ( https://www.[employer].com/article/-176P(63)50780-4/fulltext) and includes the following considerations:     Complications are most probable in the third trimester and postpartum periods. High vigilance for any changes in cardiovascular or neurologic status. She has been counseled  -Delivery planning: Vaginal birth can be considered with early epidural to limit hemodynamic shifts/ increased cardiac output, and assisted second stage.   -Postpartum- Consider ICU monitoring for 24 hrs with telemetry as this is the highest risk period. At risk for atony/ postpartum hemorrhage; complete high risk protocols with T&C, IV access, avoid methergine and hemabate if able due to smooth muscle effects. Use primarily fundal massage and cytotec. Use TXA.  Breastfeeding has shown adverse issues in animals but not in humans and is permitted.    5/22/24- Fetal echo completed and normal. Consult w/ anesthesia, cards, and MFM completed at [facility]. Recently had consult w/ Dr Soto here in [facility]. states root diameter was normal    Recommendations  - Recommend beta blockade during pregnancy. Continue Metoprolol and LDA; home BP monitoring discussed. Tight BP control with goal of 120/80.  - Close cardiology follow up- the patient should undergo serial aortic root and ascending aorta measurements throughout pregnancy. Echocardiogram and/or cardiac MRI are appropriate.   - If aortic root and ascending aorta measurements remain <4cm throughout pregnancy, I think it is appropriate for the patient to deliver in  [place]. Telemetry in labor. Consider ICU admission postpartum.  is appropriate in these circumstances, consider assisted 2nd stage to decrease amount of time with Valsalva. If there is rapid growth or if the aortic root measures >4cm in the third trimester, recommend delivery via  and this should likely occur at [facility] in conjunction with OB and Cardiac anesthesia and with CT surgery available in the event dissection or acute event occurs.  - Strict precautions given  - ASA 81mg recommended  - Fetal growth assessment q4 weeks w/ MFM  - Antepartum surveillance 2x weekly at 32 weeks.   - Delivery at 37 weeks

## 2024-05-22 NOTE — ASSESSMENT & PLAN NOTE
She reports a persistent cough with symptoms of esophageal burning worse in the AM and night. We have discussed GERD in pregnancy. Explained dietary modifications as well as medication management. She desires Omeprazole. Rx sent

## 2024-06-17 DIAGNOSIS — O35.2XX1 HEREDITARY DISEASE IN FAMILY POSSIBLY AFFECTING FETUS, FETUS 1: Primary | ICD-10-CM

## 2024-06-17 DIAGNOSIS — O35.EXX0 PYELECTASIS OF FETUS ON PRENATAL ULTRASOUND: ICD-10-CM

## 2024-06-19 ENCOUNTER — OFFICE VISIT (OUTPATIENT)
Dept: MATERNAL FETAL MEDICINE | Facility: CLINIC | Age: 29
End: 2024-06-19
Payer: COMMERCIAL

## 2024-06-19 ENCOUNTER — PROCEDURE VISIT (OUTPATIENT)
Dept: MATERNAL FETAL MEDICINE | Facility: CLINIC | Age: 29
End: 2024-06-19
Payer: COMMERCIAL

## 2024-06-19 VITALS
HEART RATE: 93 BPM | DIASTOLIC BLOOD PRESSURE: 74 MMHG | WEIGHT: 238.56 LBS | HEIGHT: 69 IN | SYSTOLIC BLOOD PRESSURE: 111 MMHG | BODY MASS INDEX: 35.33 KG/M2

## 2024-06-19 DIAGNOSIS — O35.2XX1 HEREDITARY DISEASE IN FAMILY POSSIBLY AFFECTING FETUS, FETUS 1: Primary | ICD-10-CM

## 2024-06-19 DIAGNOSIS — O99.612 GASTROESOPHAGEAL REFLUX DURING PREGNANCY IN SECOND TRIMESTER, ANTEPARTUM: ICD-10-CM

## 2024-06-19 DIAGNOSIS — K21.9 GASTROESOPHAGEAL REFLUX DURING PREGNANCY IN SECOND TRIMESTER, ANTEPARTUM: ICD-10-CM

## 2024-06-19 DIAGNOSIS — O35.EXX0 PYELECTASIS OF FETUS ON PRENATAL ULTRASOUND: ICD-10-CM

## 2024-06-19 DIAGNOSIS — O35.2XX1 HEREDITARY DISEASE IN FAMILY POSSIBLY AFFECTING FETUS, FETUS 1: ICD-10-CM

## 2024-06-19 NOTE — PROGRESS NOTES
"Maternal Fetal Medicine follow up consult    SUBJECTIVE:     Ade Simms is a 28 y.o.  female with IUP at 26w4d who is seen in follow up consultation by M.  Pregnancy complications include:   Problem   - Pyelectasis of fetus on prenatal ultrasound   - Gastroesophageal reflux during pregnancy in second trimester, antepartum   - Hereditary disease in family possibly affecting fetus, fetus 1     Ade presents for routine follow up appointment.  Denies LOF, VB, contractions. Positive fetal movement.  She is feeling well. Glucola scheduled. No CP, SOA.    Previous notes reviewed.   No changes to medical, surgical, family, social, or obstetric history.  Interval history since last [facility] visit: see above  Medications reviewed.    Care team members:  Dr Aly - Primary OB     OBJECTIVE:   /74 (BP Location: Right arm)   Pulse 93   Ht 5' 9" (1.753 m)   Wt 108.2 kg (238 lb 8.6 oz)   BMI 35.23 kg/m²     Ultrasound performed. See viewpoint for full ultrasound report.    A viable doan pregnancy is visualized in cephalic presentation.  Estimated fetal weight is at the 52nd percentile with an abdominal circumference at the 43rd percentile.   Consistent left renal pelvis dilation is noted measuring 4.0mm (UTDA1) and anatomic survey is complete. Amniotic fluid volume is normal.  Placenta is anterior.      ASSESSMENT/PLAN:     28 y.o.  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 Her father and her sister also have this gene mutation. Her father had an aortic dissection at the age of 50. Her sister had a complicated type B dissection at the age of 26 and survived. Her sister had three successful pregnancies.   She's been followed by a cardiologist at the [facility]. She had normal aortic imaging approx 6-7 yrs ago. Her last evaluation was 24 where " echo results were normal (Aortic root 3.0; EF 60%).     Mutations in ACTA2 (codes for smooth muscle specific alpha-actin, a critical component of the contractile apparatus of the vascular smooth muscle cell) predispose to thoracic aortic aneurysms and dissection as well as coronary artery and cerebrovascular disease. In one case control study, 53 women with this mutation were followed in 137 pregnancies.  eight had aortic dissections in the third trimester or the postpartum period (6% of pregnancies). One woman also had a myocardial infarct that occurred during pregnancy that was independent of her aortic dissection. Compared to the population-based frequency of peripartum aortic dissections of 0.6%, the rate of peripartum aortic dissections in women with ACTA2 mutations is much higher (8 out of 39; 20%). Six of these dissections initiated in the ascending aorta ([place] type A), three were fatal. Three women had ascending aortic dissections at diameters less that 5.0?cm (range 3.8-4.7?cm). Aortic pathology showed mild to moderate medial degeneration of the aorta in three women. Of note, five of the women had hypertension either during or before the pregnancy. In summary, the majority of women with ACTA2 mutations did not have aortic or other vascular complications with pregnancy. However, these findings show that pregnancy is associated with significant risk for aortic dissection in women with ACTA2 mutations. Women with ACTA2 mutations who are planning to get pregnant should be counseled about this risk of aortic dissection, and proper clinical management should be initiated to reduce this risk.  Other associated features of this mutation may be ocular abnormalities (iris flocculi) and skin manifestations (livedo reticularis).     When managing this disease in pregnancy, guidelines are limited and extrapolated on other aortopathy syndromes. Some guidance exists (  https://www.obstetanesthesia.com/article/-001F(90)11772-4/fulltext) and includes the following considerations:     Complications are most probable in the third trimester and postpartum periods. High vigilance for any changes in cardiovascular or neurologic status. She has been counseled  -Delivery planning: Vaginal birth can be considered with early epidural to limit hemodynamic shifts/ increased cardiac output, and assisted second stage.   -Postpartum- Consider ICU monitoring for 24 hrs with telemetry as this is the highest risk period. At risk for atony/ postpartum hemorrhage; complete high risk protocols with T&C, IV access, avoid methergine and hemabate if able due to smooth muscle effects. Use primarily fundal massage and cytotec. Use TXA.  Breastfeeding has shown adverse issues in animals but not in humans and is permitted.    24- Fetal echo completed and normal. Consult w/ anesthesia, cards, and MFM completed at [facility]. Recently had consult w/ Dr Soto here in [facility]. states root diameter was normal    24- She is doing well. Aortic root last 3cm in May. She will have another echo in third trimester.  delivery not indicated based on current assessment.    Recommendations  - Recommend beta blockade during pregnancy. Continue Metoprolol and LDA; home BP monitoring discussed. Tight BP control with goal of 120/80.  - Close cardiology follow up- the patient should undergo serial aortic root and ascending aorta measurements throughout pregnancy. Echocardiogram and/or cardiac MRI are appropriate.   - If aortic root and ascending aorta measurements remain <4cm throughout pregnancy, I think it is appropriate for the patient to deliver in [place]. Telemetry in labor. Consider ICU admission postpartum.  is appropriate in these circumstances, consider assisted 2nd stage to decrease amount of time with Valsalva. If there is rapid growth or if the aortic root measures >4cm in the third trimester,  recommend delivery via  and this should likely occur at [facility] in conjunction with OB and Cardiac anesthesia and with CT surgery available in the event dissection or acute event occurs.  - Strict precautions given  - ASA 81mg recommended  - Fetal growth assessment q4 weeks w/ MFM  - Antepartum surveillance 2x weekly at 32 weeks.   - Delivery at 37 weeks                - Pyelectasis of fetus on prenatal ultrasound  Mild left sided renal pelvic dilation measuring 4.8mm was noted consistent with UTDA1. The kidneys appear normal as does the bladder.   The finding of mild renal pelvis dilation was discussed with the patient. We reviewed basic anatomy of the renal collecting system and then discussed possible etiologies for renal pelvis dilation. When dilation of the renal pelvis is borderline or mild, most cases will resolve spontaneously. However, if the renal dilation persists in the  period, the most common conditions that can cause renal pelvis dilation are UPJ obstruction (ureteropelvic junction), UVJ obstruction (ureterovesical junction) and VUR (vesicoureteral reflux). These conditions predispose infants/children to urinary tract infection, but can be effectively followed and treated. I explained that the vast majority of infants with these findings will have spontaneous resolution during pregnancy. We will plan to re-evaluate the kidneys at her next office visit.     24: Persistent left pyelectasis measures 4mm. Very mild.    - Gastroesophageal reflux during pregnancy in second trimester, antepartum  She reports a persistent cough with symptoms of esophageal burning worse in the AM and night. We have discussed GERD in pregnancy. Explained dietary modifications as well as medication management. She desires Omeprazole. Rx sent    24- cough has resolved completely. She is continuing to take the medication.      F/u in 4 weeks for MFM visit /growth ultrasound

## 2024-06-19 NOTE — ASSESSMENT & PLAN NOTE
She reports a persistent cough with symptoms of esophageal burning worse in the AM and night. We have discussed GERD in pregnancy. Explained dietary modifications as well as medication management. She desires Omeprazole. Rx sent    6/19/24- cough has resolved completely. She is continuing to take the medication.

## 2024-06-19 NOTE — ASSESSMENT & PLAN NOTE
Mild left sided renal pelvic dilation measuring 4.8mm was noted consistent with UTDA1. The kidneys appear normal as does the bladder.   The finding of mild renal pelvis dilation was discussed with the patient. We reviewed basic anatomy of the renal collecting system and then discussed possible etiologies for renal pelvis dilation. When dilation of the renal pelvis is borderline or mild, most cases will resolve spontaneously. However, if the renal dilation persists in the  period, the most common conditions that can cause renal pelvis dilation are UPJ obstruction (ureteropelvic junction), UVJ obstruction (ureterovesical junction) and VUR (vesicoureteral reflux). These conditions predispose infants/children to urinary tract infection, but can be effectively followed and treated. I explained that the vast majority of infants with these findings will have spontaneous resolution during pregnancy. We will plan to re-evaluate the kidneys at her next office visit.     24: Persistent left pyelectasis measures 4mm. Very mild.

## 2024-06-19 NOTE — ASSESSMENT & PLAN NOTE
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 Her father and her sister also have this gene mutation. Her father had an aortic dissection at the age of 50. Her sister had a complicated type B dissection at the age of 26 and survived. Her sister had three successful pregnancies.   She's been followed by a cardiologist at the [facility]. She had normal aortic imaging approx 6-7 yrs ago. Her last evaluation was 1/31/24 where echo results were normal (Aortic root 3.0; EF 60%).     Mutations in ACTA2 (codes for smooth muscle specific alpha-actin, a critical component of the contractile apparatus of the vascular smooth muscle cell) predispose to thoracic aortic aneurysms and dissection as well as coronary artery and cerebrovascular disease. In one case control study, 53 women with this mutation were followed in 137 pregnancies.  eight had aortic dissections in the third trimester or the postpartum period (6% of pregnancies). One woman also had a myocardial infarct that occurred during pregnancy that was independent of her aortic dissection. Compared to the population-based frequency of peripartum aortic dissections of 0.6%, the rate of peripartum aortic dissections in women with ACTA2 mutations is much higher (8 out of 39; 20%). Six of these dissections initiated in the ascending aorta ([place] type A), three were fatal. Three women had ascending aortic dissections at diameters less that 5.0?cm (range 3.8-4.7?cm). Aortic pathology showed mild to moderate medial degeneration of the aorta in three women. Of note, five of the women had hypertension either during or before the pregnancy. In summary, the majority of women with ACTA2 mutations did not have aortic or other vascular complications with pregnancy. However, these findings show that pregnancy is associated with significant risk for aortic dissection in women with ACTA2 mutations. Women with ACTA2 mutations who are  planning to get pregnant should be counseled about this risk of aortic dissection, and proper clinical management should be initiated to reduce this risk.  Other associated features of this mutation may be ocular abnormalities (iris flocculi) and skin manifestations (livedo reticularis).     When managing this disease in pregnancy, guidelines are limited and extrapolated on other aortopathy syndromes. Some guidance exists ( https://www.[employer].com/article/-218T(33)71426-4/fulltext) and includes the following considerations:     Complications are most probable in the third trimester and postpartum periods. High vigilance for any changes in cardiovascular or neurologic status. She has been counseled  -Delivery planning: Vaginal birth can be considered with early epidural to limit hemodynamic shifts/ increased cardiac output, and assisted second stage.   -Postpartum- Consider ICU monitoring for 24 hrs with telemetry as this is the highest risk period. At risk for atony/ postpartum hemorrhage; complete high risk protocols with T&C, IV access, avoid methergine and hemabate if able due to smooth muscle effects. Use primarily fundal massage and cytotec. Use TXA.  Breastfeeding has shown adverse issues in animals but not in humans and is permitted.    24- Fetal echo completed and normal. Consult w/ anesthesia, cards, and MFM completed at [facility]. Recently had consult w/ Dr Soto here in [facility]. states root diameter was normal    24- She is doing well. Aortic root last 3cm in May. She will have another echo in third trimester.  delivery not indicated based on current assessment.    Recommendations  - Recommend beta blockade during pregnancy. Continue Metoprolol and LDA; home BP monitoring discussed. Tight BP control with goal of 120/80.  - Close cardiology follow up- the patient should undergo serial aortic root and ascending aorta measurements throughout pregnancy. Echocardiogram and/or cardiac MRI  are appropriate.   - If aortic root and ascending aorta measurements remain <4cm throughout pregnancy, I think it is appropriate for the patient to deliver in [place]. Telemetry in labor. Consider ICU admission postpartum.  is appropriate in these circumstances, consider assisted 2nd stage to decrease amount of time with Valsalva. If there is rapid growth or if the aortic root measures >4cm in the third trimester, recommend delivery via  and this should likely occur at [facility] in conjunction with OB and Cardiac anesthesia and with CT surgery available in the event dissection or acute event occurs.  - Strict precautions given  - ASA 81mg recommended  - Fetal growth assessment q4 weeks w/ MFM  - Antepartum surveillance 2x weekly at 32 weeks.   - Delivery at 37 weeks

## 2024-07-22 ENCOUNTER — OFFICE VISIT (OUTPATIENT)
Dept: MATERNAL FETAL MEDICINE | Facility: CLINIC | Age: 29
End: 2024-07-22
Payer: COMMERCIAL

## 2024-07-22 ENCOUNTER — PROCEDURE VISIT (OUTPATIENT)
Dept: MATERNAL FETAL MEDICINE | Facility: CLINIC | Age: 29
End: 2024-07-22
Payer: COMMERCIAL

## 2024-07-22 VITALS
DIASTOLIC BLOOD PRESSURE: 78 MMHG | HEART RATE: 91 BPM | BODY MASS INDEX: 35.79 KG/M2 | HEIGHT: 69 IN | SYSTOLIC BLOOD PRESSURE: 122 MMHG | WEIGHT: 241.63 LBS

## 2024-07-22 DIAGNOSIS — O99.612 GASTROESOPHAGEAL REFLUX DURING PREGNANCY IN SECOND TRIMESTER, ANTEPARTUM: ICD-10-CM

## 2024-07-22 DIAGNOSIS — O35.2XX1 HEREDITARY DISEASE IN FAMILY POSSIBLY AFFECTING FETUS, FETUS 1: Primary | ICD-10-CM

## 2024-07-22 DIAGNOSIS — O35.EXX0 PYELECTASIS OF FETUS ON PRENATAL ULTRASOUND: ICD-10-CM

## 2024-07-22 DIAGNOSIS — K21.9 GASTROESOPHAGEAL REFLUX DURING PREGNANCY IN SECOND TRIMESTER, ANTEPARTUM: ICD-10-CM

## 2024-07-22 DIAGNOSIS — O35.2XX1 HEREDITARY DISEASE IN FAMILY POSSIBLY AFFECTING FETUS, FETUS 1: ICD-10-CM

## 2024-07-22 PROCEDURE — 3078F DIAST BP <80 MM HG: CPT | Mod: CPTII,S$GLB,, | Performed by: OBSTETRICS & GYNECOLOGY

## 2024-07-22 PROCEDURE — 1160F RVW MEDS BY RX/DR IN RCRD: CPT | Mod: CPTII,S$GLB,, | Performed by: OBSTETRICS & GYNECOLOGY

## 2024-07-22 PROCEDURE — 3074F SYST BP LT 130 MM HG: CPT | Mod: CPTII,S$GLB,, | Performed by: OBSTETRICS & GYNECOLOGY

## 2024-07-22 PROCEDURE — 1159F MED LIST DOCD IN RCRD: CPT | Mod: CPTII,S$GLB,, | Performed by: OBSTETRICS & GYNECOLOGY

## 2024-07-22 PROCEDURE — 99213 OFFICE O/P EST LOW 20 MIN: CPT | Mod: S$GLB,,, | Performed by: OBSTETRICS & GYNECOLOGY

## 2024-07-22 PROCEDURE — 76816 OB US FOLLOW-UP PER FETUS: CPT | Mod: S$GLB,,, | Performed by: OBSTETRICS & GYNECOLOGY

## 2024-07-22 PROCEDURE — 3008F BODY MASS INDEX DOCD: CPT | Mod: CPTII,S$GLB,, | Performed by: OBSTETRICS & GYNECOLOGY

## 2024-07-22 NOTE — ASSESSMENT & PLAN NOTE
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 Her father and her sister also have this gene mutation. Her father had an aortic dissection at the age of 50. Her sister had a complicated type B dissection at the age of 26 and survived. Her sister had three successful pregnancies.   She's been followed by a cardiologist at the [facility]. She had normal aortic imaging approx 6-7 yrs ago. Her last evaluation was 1/31/24 where echo results were normal (Aortic root 3.0; EF 60%).     Mutations in ACTA2 (codes for smooth muscle specific alpha-actin, a critical component of the contractile apparatus of the vascular smooth muscle cell) predispose to thoracic aortic aneurysms and dissection as well as coronary artery and cerebrovascular disease. In one case control study, 53 women with this mutation were followed in 137 pregnancies.  eight had aortic dissections in the third trimester or the postpartum period (6% of pregnancies). One woman also had a myocardial infarct that occurred during pregnancy that was independent of her aortic dissection. Compared to the population-based frequency of peripartum aortic dissections of 0.6%, the rate of peripartum aortic dissections in women with ACTA2 mutations is much higher (8 out of 39; 20%). Six of these dissections initiated in the ascending aorta ([place] type A), three were fatal. Three women had ascending aortic dissections at diameters less that 5.0?cm (range 3.8-4.7?cm). Aortic pathology showed mild to moderate medial degeneration of the aorta in three women. Of note, five of the women had hypertension either during or before the pregnancy. In summary, the majority of women with ACTA2 mutations did not have aortic or other vascular complications with pregnancy. However, these findings show that pregnancy is associated with significant risk for aortic dissection in women with ACTA2 mutations. Women with ACTA2 mutations who are  planning to get pregnant should be counseled about this risk of aortic dissection, and proper clinical management should be initiated to reduce this risk.  Other associated features of this mutation may be ocular abnormalities (iris flocculi) and skin manifestations (livedo reticularis).     When managing this disease in pregnancy, guidelines are limited and extrapolated on other aortopathy syndromes. Some guidance exists ( https://www.[employer].com/article/-201U(02)55672-4/fulltext) and includes the following considerations:     Complications are most probable in the third trimester and postpartum periods. High vigilance for any changes in cardiovascular or neurologic status. She has been counseled  -Delivery planning: Vaginal birth can be considered with early epidural to limit hemodynamic shifts/ increased cardiac output, and assisted second stage.   -Postpartum- Consider ICU monitoring for 24 hrs with telemetry as this is the highest risk period. At risk for atony/ postpartum hemorrhage; complete high risk protocols with T&C, IV access, avoid methergine and hemabate if able due to smooth muscle effects. Use primarily fundal massage and cytotec. Use TXA.  Breastfeeding has shown adverse issues in animals but not in humans and is permitted.    24- Fetal echo completed and normal. Consult w/ anesthesia, cards, and MFM completed at [facility]. Recently had consult w/ Dr Soto here in [facility]. states root diameter was normal    24- She is doing well. Aortic root last 3cm in May. She will have another echo in third trimester.  delivery not indicated based on current assessment.    Recommendations  - Recommend beta blockade during pregnancy. Continue Metoprolol and LDA; home BP monitoring discussed. Tight BP control with goal of 120/80.  - Close cardiology follow up- the patient should undergo serial aortic root and ascending aorta measurements throughout pregnancy. Echocardiogram and/or cardiac MRI  are appropriate.   - If aortic root and ascending aorta measurements remain <4cm throughout pregnancy, I think it is appropriate for the patient to deliver in [place]. Telemetry in labor. Consider ICU admission postpartum.  is appropriate in these circumstances, consider assisted 2nd stage to decrease amount of time with Valsalva. If there is rapid growth or if the aortic root measures >4cm in the third trimester, recommend delivery via  and this should likely occur at [facility] in conjunction with OB and Cardiac anesthesia and with CT surgery available in the event dissection or acute event occurs.  - Strict precautions given  - ASA 81mg recommended  - Fetal growth assessment q4 weeks w/ MFM  - Antepartum surveillance once weekly at 32 weeks (can schedule through primary OB).   - Delivery at 37 weeks

## 2024-07-22 NOTE — ASSESSMENT & PLAN NOTE
Mild left sided renal pelvic dilation measuring 4.8mm was noted consistent with UTDA1. The kidneys appear normal as does the bladder.   The finding of mild renal pelvis dilation was discussed with the patient. We reviewed basic anatomy of the renal collecting system and then discussed possible etiologies for renal pelvis dilation. When dilation of the renal pelvis is borderline or mild, most cases will resolve spontaneously. However, if the renal dilation persists in the  period, the most common conditions that can cause renal pelvis dilation are UPJ obstruction (ureteropelvic junction), UVJ obstruction (ureterovesical junction) and VUR (vesicoureteral reflux). These conditions predispose infants/children to urinary tract infection, but can be effectively followed and treated. I explained that the vast majority of infants with these findings will have spontaneous resolution during pregnancy. We will plan to re-evaluate the kidneys at her next office visit.     24: Persistent left pyelectasis measures 4mm. Very mild.    24: Previously noted pyelectasis has spontaneously resolved.

## 2024-07-22 NOTE — PROGRESS NOTES
"Maternal Fetal Medicine follow up consult    SUBJECTIVE:     Ade Simms is a 28 y.o.  female with IUP at 31w2d who is seen in follow up consultation by MFM.  Pregnancy complications include:   Problem   - Pyelectasis of fetus on prenatal ultrasound   - Gastroesophageal reflux during pregnancy in second trimester, antepartum   - Hereditary disease in family possibly affecting fetus, fetus 1     Ade presents for routine follow up appointment.  Denies LOF, VB, contractions. Positive fetal movement.  She has been watching BP very closely and is normotensive. She just recently got over COVID.   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 We are giving a letter to her for restrictions.   She is due for Echo and to see Dr. Soto.   We will be preparing for a delivery date soon.   She has questions about TDAP.    Previous notes reviewed.   No changes to medical, surgical, family, social, or obstetric history.  Interval history since last M visit: see above  Medications reviewed.    Care team members:  Dr Aly - [facility]     OBJECTIVE:   /78 (BP Location: Right arm)   Pulse 91   Ht 5' 9" (1.753 m)   Wt 109.6 kg (241 lb 10 oz)   BMI 35.68 kg/m²     Ultrasound performed. See viewpoint for full ultrasound report.    A viable doan pregnancy is visualized in cephalic presentation.  Estimated fetal weight is at the 50th percentile with an abdominal circumference at the 91st percentile.    No fetal abnormalities are noted and previous anatomic survey was normal. (Previously pyelectasis resolved). Amniotic fluid volume is normal.  Placenta is anterior.      ASSESSMENT/PLAN:     28 y.o.  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 Her father and her sister also have this gene mutation. Her father had an aortic " dissection at the age of 50. Her sister had a complicated type B dissection at the age of 26 and survived. Her sister had three successful pregnancies.   She's been followed by a cardiologist at the [facility]. She had normal aortic imaging approx 6-7 yrs ago. Her last evaluation was 1/31/24 where echo results were normal (Aortic root 3.0; EF 60%).     Mutations in ACTA2 (codes for smooth muscle specific alpha-actin, a critical component of the contractile apparatus of the vascular smooth muscle cell) predispose to thoracic aortic aneurysms and dissection as well as coronary artery and cerebrovascular disease. In one case control study, 53 women with this mutation were followed in 137 pregnancies.  eight had aortic dissections in the third trimester or the postpartum period (6% of pregnancies). One woman also had a myocardial infarct that occurred during pregnancy that was independent of her aortic dissection. Compared to the population-based frequency of peripartum aortic dissections of 0.6%, the rate of peripartum aortic dissections in women with ACTA2 mutations is much higher (8 out of 39; 20%). Six of these dissections initiated in the ascending aorta (Anthony type A), three were fatal. Three women had ascending aortic dissections at diameters less that 5.0?cm (range 3.8-4.7?cm). Aortic pathology showed mild to moderate medial degeneration of the aorta in three women. Of note, five of the women had hypertension either during or before the pregnancy. In summary, the majority of women with ACTA2 mutations did not have aortic or other vascular complications with pregnancy. However, these findings show that pregnancy is associated with significant risk for aortic dissection in women with ACTA2 mutations. Women with ACTA2 mutations who are planning to get pregnant should be counseled about this risk of aortic dissection, and proper clinical management should be initiated to reduce this risk.  Other  associated features of this mutation may be ocular abnormalities (iris flocculi) and skin manifestations (livedo reticularis).     When managing this disease in pregnancy, guidelines are limited and extrapolated on other aortopathy syndromes. Some guidance exists ( https://www.obstetanesthesia.com/article/-349T(72)35327-4/fulltext) and includes the following considerations:     Complications are most probable in the third trimester and postpartum periods. High vigilance for any changes in cardiovascular or neurologic status. She has been counseled  -Delivery planning: Vaginal birth can be considered with early epidural to limit hemodynamic shifts/ increased cardiac output, and assisted second stage.   -Postpartum- Consider ICU monitoring for 24 hrs with telemetry as this is the highest risk period. At risk for atony/ postpartum hemorrhage; complete high risk protocols with T&C, IV access, avoid methergine and hemabate if able due to smooth muscle effects. Use primarily fundal massage and cytotec. Use TXA.  Breastfeeding has shown adverse issues in animals but not in humans and is permitted.    24- Fetal echo completed and normal. Consult w/ anesthesia, cards, and MFM completed at [facility]. Recently had consult w/ Dr Soto here in [facility]. states root diameter was normal    24- She is doing well. Aortic root last 3cm in May. She will have another echo in third trimester.  delivery not indicated based on current assessment.    Recommendations  - Recommend beta blockade during pregnancy. Continue Metoprolol and LDA; home BP monitoring discussed. Tight BP control with goal of 120/80.  - Close cardiology follow up- the patient should undergo serial aortic root and ascending aorta measurements throughout pregnancy. Echocardiogram and/or cardiac MRI are appropriate.   - If aortic root and ascending aorta measurements remain <4cm throughout pregnancy, I think it is appropriate for the patient to deliver in  [place]. Telemetry in labor. Consider ICU admission postpartum.  is appropriate in these circumstances, consider assisted 2nd stage to decrease amount of time with Valsalva. If there is rapid growth or if the aortic root measures >4cm in the third trimester, recommend delivery via  and this should likely occur at [facility] in conjunction with OB and Cardiac anesthesia and with CT surgery available in the event dissection or acute event occurs.  - Strict precautions given  - ASA 81mg recommended  - Fetal growth assessment q4 weeks w/ MFM  - Antepartum surveillance once weekly at 32 weeks (can schedule through primary OB).   - Delivery at 37 weeks                - Pyelectasis of fetus on prenatal ultrasound  Mild left sided renal pelvic dilation measuring 4.8mm was noted consistent with UTDA1. The kidneys appear normal as does the bladder.   The finding of mild renal pelvis dilation was discussed with the patient. We reviewed basic anatomy of the renal collecting system and then discussed possible etiologies for renal pelvis dilation. When dilation of the renal pelvis is borderline or mild, most cases will resolve spontaneously. However, if the renal dilation persists in the  period, the most common conditions that can cause renal pelvis dilation are UPJ obstruction (ureteropelvic junction), UVJ obstruction (ureterovesical junction) and VUR (vesicoureteral reflux). These conditions predispose infants/children to urinary tract infection, but can be effectively followed and treated. I explained that the vast majority of infants with these findings will have spontaneous resolution during pregnancy. We will plan to re-evaluate the kidneys at her next office visit.     24: Persistent left pyelectasis measures 4mm. Very mild.    24: Previously noted pyelectasis has spontaneously resolved.     - Gastroesophageal reflux during pregnancy in second trimester, antepartum  She reports a  persistent cough with symptoms of esophageal burning worse in the AM and night. We have discussed GERD in pregnancy. Explained dietary modifications as well as medication management. She desires Omeprazole. Rx sent    6/19/24- cough has resolved completely. She is continuing to take the medication.      F/u in 4 weeks for MFM visit /growth ultrasound    Meli Samayoa MD  Maternal Fetal Medicine

## 2024-07-22 NOTE — LETTER
July 22, 2024    Ade Simms  [address]  [facility] [address]             [facility]  [address], [address]  [place] [address] Dear employer of Ade Simms:    She is under the care of our high risk OB clinic. Due to her condition, she should not lift, push, or pull anything greater than 20lbs. Please make necessary accommodations for her with her work.    If you have any questions or concerns, please don't hesitate to call.    Sincerely,        Meli Samayoa MD

## 2024-07-25 DIAGNOSIS — I71.00 AORTIC ANEURYSM AND DISSECTION: Primary | ICD-10-CM

## 2024-08-15 DIAGNOSIS — O35.2XX1 HEREDITARY DISEASE IN FAMILY POSSIBLY AFFECTING FETUS, FETUS 1: Primary | ICD-10-CM

## 2024-08-15 DIAGNOSIS — O35.EXX0 PYELECTASIS OF FETUS ON PRENATAL ULTRASOUND: ICD-10-CM

## 2024-08-19 ENCOUNTER — OFFICE VISIT (OUTPATIENT)
Dept: MATERNAL FETAL MEDICINE | Facility: CLINIC | Age: 29
End: 2024-08-19
Payer: COMMERCIAL

## 2024-08-19 ENCOUNTER — PROCEDURE VISIT (OUTPATIENT)
Dept: MATERNAL FETAL MEDICINE | Facility: CLINIC | Age: 29
End: 2024-08-19
Payer: COMMERCIAL

## 2024-08-19 VITALS
DIASTOLIC BLOOD PRESSURE: 81 MMHG | HEART RATE: 102 BPM | HEIGHT: 69 IN | WEIGHT: 251.19 LBS | BODY MASS INDEX: 37.2 KG/M2 | SYSTOLIC BLOOD PRESSURE: 117 MMHG

## 2024-08-19 DIAGNOSIS — K21.9 GASTROESOPHAGEAL REFLUX DURING PREGNANCY IN SECOND TRIMESTER, ANTEPARTUM: ICD-10-CM

## 2024-08-19 DIAGNOSIS — O35.2XX1 HEREDITARY DISEASE IN FAMILY POSSIBLY AFFECTING FETUS, FETUS 1: ICD-10-CM

## 2024-08-19 DIAGNOSIS — O99.612 GASTROESOPHAGEAL REFLUX DURING PREGNANCY IN SECOND TRIMESTER, ANTEPARTUM: ICD-10-CM

## 2024-08-19 DIAGNOSIS — O35.2XX1 HEREDITARY DISEASE IN FAMILY POSSIBLY AFFECTING FETUS, FETUS 1: Primary | ICD-10-CM

## 2024-08-19 DIAGNOSIS — O35.EXX0 PYELECTASIS OF FETUS ON PRENATAL ULTRASOUND: ICD-10-CM

## 2024-08-19 PROCEDURE — 3079F DIAST BP 80-89 MM HG: CPT | Mod: CPTII,S$GLB,, | Performed by: OBSTETRICS & GYNECOLOGY

## 2024-08-19 PROCEDURE — 1160F RVW MEDS BY RX/DR IN RCRD: CPT | Mod: CPTII,S$GLB,, | Performed by: OBSTETRICS & GYNECOLOGY

## 2024-08-19 PROCEDURE — 76816 OB US FOLLOW-UP PER FETUS: CPT | Mod: S$GLB,,, | Performed by: OBSTETRICS & GYNECOLOGY

## 2024-08-19 PROCEDURE — 99214 OFFICE O/P EST MOD 30 MIN: CPT | Mod: S$GLB,,, | Performed by: OBSTETRICS & GYNECOLOGY

## 2024-08-19 PROCEDURE — 76819 FETAL BIOPHYS PROFIL W/O NST: CPT | Mod: S$GLB,,, | Performed by: OBSTETRICS & GYNECOLOGY

## 2024-08-19 PROCEDURE — 3074F SYST BP LT 130 MM HG: CPT | Mod: CPTII,S$GLB,, | Performed by: OBSTETRICS & GYNECOLOGY

## 2024-08-19 PROCEDURE — 1159F MED LIST DOCD IN RCRD: CPT | Mod: CPTII,S$GLB,, | Performed by: OBSTETRICS & GYNECOLOGY

## 2024-08-19 PROCEDURE — 3008F BODY MASS INDEX DOCD: CPT | Mod: CPTII,S$GLB,, | Performed by: OBSTETRICS & GYNECOLOGY

## 2024-08-21 NOTE — PROGRESS NOTES
"Maternal Fetal Medicine follow up consult    SUBJECTIVE:     Ade Simms is a 28 y.o.  female with IUP at 35w2d who is seen in follow up consultation by M.  Pregnancy complications include:   Problem   - Gastroesophageal reflux during pregnancy in second trimester, antepartum   - Hereditary disease in family possibly affecting fetus, fetus 1       Ade presents for routine follow up appointment.  Denies LOF, VB, contractions. Positive fetal movement.  She has been watching BP very closely and is normotensive.   She saw Dr. Caraballo and had an echo recently which was without any change.  She is scheduled for induction on .  Dr. Teran has been in discussion with us from cardiology about her delivery plan.  We will have a multidisciplinary meeting next week.    Previous notes reviewed.   No changes to medical, surgical, family, social, or obstetric history.  Interval history since last MFM visit: see above  Medications reviewed.    Care team members:  Dr Aly - [facility]     OBJECTIVE:   /81 (BP Location: Right arm)   Pulse 102   Ht 5' 9" (1.753 m)   Wt 114 kg (251 lb 3.4 oz)   BMI 37.10 kg/m²     Ultrasound performed. See viewpoint for full ultrasound report.    A viable doan pregnancy is visualized in cephalic presentation.  Estimated fetal weight is at the 61st percentile with an abdominal circumference at the 82nd percentile.    No fetal abnormalities are noted and previous anatomic survey was normal. Amniotic fluid volume is normal.  Placenta is anterior. Biophysical profile is 8/8.     ASSESSMENT/PLAN:     28 y.o.  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 Her father and her sister also have this gene mutation. Her father had an aortic dissection at the age of 50. Her sister had a complicated type B dissection at the age of 26 and " survived. Her sister had three successful pregnancies.   She's been followed by a cardiologist at the [facility]. She had normal aortic imaging approx 6-7 yrs ago. Her last evaluation was 1/31/24 where echo results were normal (Aortic root 3.0; EF 60%).     Mutations in ACTA2 (codes for smooth muscle specific alpha-actin, a critical component of the contractile apparatus of the vascular smooth muscle cell) predispose to thoracic aortic aneurysms and dissection as well as coronary artery and cerebrovascular disease. In one case control study, 53 women with this mutation were followed in 137 pregnancies.  eight had aortic dissections in the third trimester or the postpartum period (6% of pregnancies). One woman also had a myocardial infarct that occurred during pregnancy that was independent of her aortic dissection. Compared to the population-based frequency of peripartum aortic dissections of 0.6%, the rate of peripartum aortic dissections in women with ACTA2 mutations is much higher (8 out of 39; 20%). Six of these dissections initiated in the ascending aorta (Mesilla type A), three were fatal. Three women had ascending aortic dissections at diameters less that 5.0?cm (range 3.8-4.7?cm). Aortic pathology showed mild to moderate medial degeneration of the aorta in three women. Of note, five of the women had hypertension either during or before the pregnancy. In summary, the majority of women with ACTA2 mutations did not have aortic or other vascular complications with pregnancy. However, these findings show that pregnancy is associated with significant risk for aortic dissection in women with ACTA2 mutations. Women with ACTA2 mutations who are planning to get pregnant should be counseled about this risk of aortic dissection, and proper clinical management should be initiated to reduce this risk.  Other associated features of this mutation may be ocular abnormalities (iris flocculi) and skin manifestations  (livedo reticularis).     When managing this disease in pregnancy, guidelines are limited and extrapolated on other aortopathy syndromes. Some guidance exists ( https://www.obstetanesthesia.com/article/-701Y(42)11469-4/fulltext) and includes the following considerations:     Complications are most probable in the third trimester and postpartum periods. High vigilance for any changes in cardiovascular or neurologic status. She has been counseled  -Delivery planning: Vaginal birth can be considered with early epidural to limit hemodynamic shifts/ increased cardiac output, and assisted second stage.   -Postpartum- Consider ICU monitoring for 24 hrs with telemetry as this is the highest risk period. At risk for atony/ postpartum hemorrhage; complete high risk protocols with T&C, IV access, avoid methergine and hemabate if able due to smooth muscle effects. Use primarily fundal massage and cytotec. Use TXA.  Breastfeeding has shown adverse issues in animals but not in humans and is permitted.    24- Fetal echo completed and normal. Consult w/ anesthesia, cards, and MFM completed at [facility]. Recently had consult w/ Dr Soto here in [facility]. states root diameter was normal    24- She is doing well. Aortic root last 3cm in May. She will have another echo in third trimester.  delivery not indicated based on current assessment.    24-   Aortic root size has not changed.  We are planning induction of labor on  at [facility].  Multidisciplinary conference to occur next week.    Recommendations  - Recommend beta blockade during pregnancy. Continue Metoprolol and LDA; home BP monitoring discussed. Tight BP control with goal of 120/80.  - Close cardiology follow up- the patient should undergo serial aortic root and ascending aorta measurements throughout pregnancy. Echocardiogram and/or cardiac MRI are appropriate.   - If aortic root and ascending aorta measurements remain <4cm throughout  pregnancy, I think it is appropriate for the patient to deliver in [place]. Telemetry in labor. Consider ICU admission postpartum.  is appropriate in these circumstances, consider assisted 2nd stage to decrease amount of time with Valsalva. If there is rapid growth or if the aortic root measures >4cm in the third trimester, recommend delivery via  and this should likely occur at [facility] in conjunction with OB and Cardiac anesthesia and with CT surgery available in the event dissection or acute event occurs.  - Strict precautions given  - ASA 81mg recommended  - Fetal growth assessment q4 weeks w/ MFM  - Antepartum surveillance once weekly at 32 weeks (can schedule through primary OB).   - Delivery at 37 weeks                - Gastroesophageal reflux during pregnancy in second trimester, antepartum  She reports a persistent cough with symptoms of esophageal burning worse in the AM and night. We have discussed GERD in pregnancy. Explained dietary modifications as well as medication management. She desires Omeprazole. Rx sent    24- cough has resolved completely. She is continuing to take the medication.    24: Doing well        No MFM follow-up was scheduled    Meli Samayoa MD  Maternal Fetal Medicine

## 2024-08-21 NOTE — ASSESSMENT & PLAN NOTE
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 Her father and her sister also have this gene mutation. Her father had an aortic dissection at the age of 50. Her sister had a complicated type B dissection at the age of 26 and survived. Her sister had three successful pregnancies.   She's been followed by a cardiologist at the [facility]. She had normal aortic imaging approx 6-7 yrs ago. Her last evaluation was 1/31/24 where echo results were normal (Aortic root 3.0; EF 60%).     Mutations in ACTA2 (codes for smooth muscle specific alpha-actin, a critical component of the contractile apparatus of the vascular smooth muscle cell) predispose to thoracic aortic aneurysms and dissection as well as coronary artery and cerebrovascular disease. In one case control study, 53 women with this mutation were followed in 137 pregnancies.  eight had aortic dissections in the third trimester or the postpartum period (6% of pregnancies). One woman also had a myocardial infarct that occurred during pregnancy that was independent of her aortic dissection. Compared to the population-based frequency of peripartum aortic dissections of 0.6%, the rate of peripartum aortic dissections in women with ACTA2 mutations is much higher (8 out of 39; 20%). Six of these dissections initiated in the ascending aorta (Anthony type A), three were fatal. Three women had ascending aortic dissections at diameters less that 5.0?cm (range 3.8-4.7?cm). Aortic pathology showed mild to moderate medial degeneration of the aorta in three women. Of note, five of the women had hypertension either during or before the pregnancy. In summary, the majority of women with ACTA2 mutations did not have aortic or other vascular complications with pregnancy. However, these findings show that pregnancy is associated with significant risk for aortic dissection in women with ACTA2 mutations. Women with ACTA2 mutations who are  planning to get pregnant should be counseled about this risk of aortic dissection, and proper clinical management should be initiated to reduce this risk.  Other associated features of this mutation may be ocular abnormalities (iris flocculi) and skin manifestations (livedo reticularis).     When managing this disease in pregnancy, guidelines are limited and extrapolated on other aortopathy syndromes. Some guidance exists ( https://www.AnyPres[employer].com/article/-086E(01)83998-4/fulltext) and includes the following considerations:     Complications are most probable in the third trimester and postpartum periods. High vigilance for any changes in cardiovascular or neurologic status. She has been counseled  -Delivery planning: Vaginal birth can be considered with early epidural to limit hemodynamic shifts/ increased cardiac output, and assisted second stage.   -Postpartum- Consider ICU monitoring for 24 hrs with telemetry as this is the highest risk period. At risk for atony/ postpartum hemorrhage; complete high risk protocols with T&C, IV access, avoid methergine and hemabate if able due to smooth muscle effects. Use primarily fundal massage and cytotec. Use TXA.  Breastfeeding has shown adverse issues in animals but not in humans and is permitted.    24- Fetal echo completed and normal. Consult w/ anesthesia, cards, and MFM completed at [facility]. Recently had consult w/ Dr Soto here in [facility]. states root diameter was normal    24- She is doing well. Aortic root last 3cm in May. She will have another echo in third trimester.  delivery not indicated based on current assessment.    24-   Aortic root size has not changed.  We are planning induction of labor on  at [facility].  Multidisciplinary conference to occur next week.    Recommendations  - Recommend beta blockade during pregnancy. Continue Metoprolol and LDA; home BP monitoring discussed. Tight BP control with goal of  120/80.  - Close cardiology follow up- the patient should undergo serial aortic root and ascending aorta measurements throughout pregnancy. Echocardiogram and/or cardiac MRI are appropriate.   - If aortic root and ascending aorta measurements remain <4cm throughout pregnancy, I think it is appropriate for the patient to deliver in [place]. Telemetry in labor. Consider ICU admission postpartum.  is appropriate in these circumstances, consider assisted 2nd stage to decrease amount of time with Valsalva. If there is rapid growth or if the aortic root measures >4cm in the third trimester, recommend delivery via  and this should likely occur at [facility] in conjunction with OB and Cardiac anesthesia and with CT surgery available in the event dissection or acute event occurs.  - Strict precautions given  - ASA 81mg recommended  - Fetal growth assessment q4 weeks w/ MFM  - Antepartum surveillance once weekly at 32 weeks (can schedule through primary OB).   - Delivery at 37 weeks

## 2024-08-21 NOTE — ASSESSMENT & PLAN NOTE
She reports a persistent cough with symptoms of esophageal burning worse in the AM and night. We have discussed GERD in pregnancy. Explained dietary modifications as well as medication management. She desires Omeprazole. Rx sent    6/19/24- cough has resolved completely. She is continuing to take the medication.    8/21/24: Doing well

## 2024-09-04 ENCOUNTER — ANESTHESIA EVENT (OUTPATIENT)
Dept: SURGERY | Facility: HOSPITAL | Age: 29
End: 2024-09-04
Payer: COMMERCIAL

## 2024-09-09 ENCOUNTER — LAB VISIT (OUTPATIENT)
Dept: LAB | Facility: HOSPITAL | Age: 29
End: 2024-09-09
Attending: OBSTETRICS & GYNECOLOGY
Payer: COMMERCIAL

## 2024-09-09 DIAGNOSIS — Z98.891 HISTORY OF PRIMARY CESAREAN SECTION: Primary | ICD-10-CM

## 2024-09-09 LAB
BACTERIA #/AREA URNS AUTO: ABNORMAL /HPF
BILIRUB UR QL STRIP.AUTO: NEGATIVE
CLARITY UR: ABNORMAL
COLOR UR AUTO: ABNORMAL
ERYTHROCYTE [DISTWIDTH] IN BLOOD BY AUTOMATED COUNT: 13.2 % (ref 11.5–17)
GLUCOSE UR QL STRIP: NORMAL
GROUP & RH: NORMAL
HCT VFR BLD AUTO: 35.2 % (ref 37–47)
HGB BLD-MCNC: 12.5 G/DL (ref 12–16)
HGB UR QL STRIP: NEGATIVE
INDIRECT COOMBS: NORMAL
KETONES UR QL STRIP: NEGATIVE
LEUKOCYTE ESTERASE UR QL STRIP: NEGATIVE
MCH RBC QN AUTO: 30 PG (ref 27–31)
MCHC RBC AUTO-ENTMCNC: 35.5 G/DL (ref 33–36)
MCV RBC AUTO: 84.6 FL (ref 80–94)
MUCOUS THREADS URNS QL MICRO: ABNORMAL /LPF
NITRITE UR QL STRIP: NEGATIVE
NRBC BLD AUTO-RTO: 0 %
PH UR STRIP: 7 [PH]
PLATELET # BLD AUTO: 190 X10(3)/MCL (ref 130–400)
PMV BLD AUTO: 10.4 FL (ref 7.4–10.4)
PROT UR QL STRIP: NEGATIVE
RBC # BLD AUTO: 4.16 X10(6)/MCL (ref 4.2–5.4)
RBC #/AREA URNS AUTO: ABNORMAL /HPF
SP GR UR STRIP.AUTO: 1.01 (ref 1–1.03)
SPECIMEN OUTDATE: NORMAL
SQUAMOUS #/AREA URNS LPF: ABNORMAL /HPF
T PALLIDUM AB SER QL: NONREACTIVE
UROBILINOGEN UR STRIP-ACNC: NORMAL
WBC # BLD AUTO: 8.76 X10(3)/MCL (ref 4.5–11.5)
WBC #/AREA URNS AUTO: ABNORMAL /HPF

## 2024-09-09 PROCEDURE — 85461 HEMOGLOBIN FETAL: CPT | Performed by: OBSTETRICS & GYNECOLOGY

## 2024-09-09 PROCEDURE — 86901 BLOOD TYPING SEROLOGIC RH(D): CPT | Performed by: OBSTETRICS & GYNECOLOGY

## 2024-09-09 PROCEDURE — 86780 TREPONEMA PALLIDUM: CPT

## 2024-09-09 PROCEDURE — 86900 BLOOD TYPING SEROLOGIC ABO: CPT | Performed by: OBSTETRICS & GYNECOLOGY

## 2024-09-09 PROCEDURE — 85027 COMPLETE CBC AUTOMATED: CPT

## 2024-09-09 PROCEDURE — 81001 URINALYSIS AUTO W/SCOPE: CPT

## 2024-09-09 PROCEDURE — 36415 COLL VENOUS BLD VENIPUNCTURE: CPT

## 2024-09-09 PROCEDURE — 81015 MICROSCOPIC EXAM OF URINE: CPT

## 2024-09-10 ENCOUNTER — HOSPITAL ENCOUNTER (INPATIENT)
Facility: HOSPITAL | Age: 29
LOS: 3 days | Discharge: HOME | End: 2024-09-13
Attending: OBSTETRICS & GYNECOLOGY | Admitting: OBSTETRICS & GYNECOLOGY
Payer: COMMERCIAL

## 2024-09-10 ENCOUNTER — ANESTHESIA (OUTPATIENT)
Dept: SURGERY | Facility: HOSPITAL | Age: 29
End: 2024-09-10
Payer: COMMERCIAL

## 2024-09-10 DIAGNOSIS — K21.9 GASTROESOPHAGEAL REFLUX DURING PREGNANCY IN SECOND TRIMESTER, ANTEPARTUM: ICD-10-CM

## 2024-09-10 DIAGNOSIS — Z3A.38 38 WEEKS GESTATION OF PREGNANCY: ICD-10-CM

## 2024-09-10 DIAGNOSIS — Z15.89 MONOALLELIC MUTATION OF ACTA2 GENE: ICD-10-CM

## 2024-09-10 DIAGNOSIS — O99.612 GASTROESOPHAGEAL REFLUX DURING PREGNANCY IN SECOND TRIMESTER, ANTEPARTUM: ICD-10-CM

## 2024-09-10 DIAGNOSIS — O35.EXX0 PYELECTASIS OF FETUS ON PRENATAL ULTRASOUND: ICD-10-CM

## 2024-09-10 DIAGNOSIS — E01.0 THYROMEGALY: ICD-10-CM

## 2024-09-10 DIAGNOSIS — Q25.43 ACTA2-RELATED FAMILIAL THORACIC AORTIC ANEURYSM: ICD-10-CM

## 2024-09-10 DIAGNOSIS — O35.2XX1 HEREDITARY DISEASE IN FAMILY POSSIBLY AFFECTING FETUS, FETUS 1: ICD-10-CM

## 2024-09-10 LAB
GROUP & RH: NORMAL
HCT VFR BLD AUTO: 28.9 % (ref 37–47)
HGB BLD-MCNC: 10.3 G/DL (ref 12–16)
INDIRECT COOMBS: NORMAL
ROSETTE - FMH (FETAL BLEED SCREEN): NORMAL
SPECIMEN OUTDATE: NORMAL

## 2024-09-10 PROCEDURE — 85014 HEMATOCRIT: CPT | Performed by: OBSTETRICS & GYNECOLOGY

## 2024-09-10 PROCEDURE — 71000033 HC RECOVERY, INTIAL HOUR: Performed by: OBSTETRICS & GYNECOLOGY

## 2024-09-10 PROCEDURE — 36000709 HC OR TIME LEV III EA ADD 15 MIN: Performed by: OBSTETRICS & GYNECOLOGY

## 2024-09-10 PROCEDURE — 99223 1ST HOSP IP/OBS HIGH 75: CPT | Mod: ,,, | Performed by: OBSTETRICS & GYNECOLOGY

## 2024-09-10 PROCEDURE — 63600175 PHARM REV CODE 636 W HCPCS: Performed by: OBSTETRICS & GYNECOLOGY

## 2024-09-10 PROCEDURE — 11000001 HC ACUTE MED/SURG PRIVATE ROOM

## 2024-09-10 PROCEDURE — 86901 BLOOD TYPING SEROLOGIC RH(D): CPT | Performed by: OBSTETRICS & GYNECOLOGY

## 2024-09-10 PROCEDURE — 63600175 PHARM REV CODE 636 W HCPCS: Performed by: NURSE ANESTHETIST, CERTIFIED REGISTERED

## 2024-09-10 PROCEDURE — 37000009 HC ANESTHESIA EA ADD 15 MINS: Performed by: OBSTETRICS & GYNECOLOGY

## 2024-09-10 PROCEDURE — 25000003 PHARM REV CODE 250: Performed by: OBSTETRICS & GYNECOLOGY

## 2024-09-10 PROCEDURE — 62322 NJX INTERLAMINAR LMBR/SAC: CPT | Performed by: STUDENT IN AN ORGANIZED HEALTH CARE EDUCATION/TRAINING PROGRAM

## 2024-09-10 PROCEDURE — 27000492 HC SLEEVE, SCD T/L

## 2024-09-10 PROCEDURE — 25000003 PHARM REV CODE 250: Performed by: NURSE ANESTHETIST, CERTIFIED REGISTERED

## 2024-09-10 PROCEDURE — 36415 COLL VENOUS BLD VENIPUNCTURE: CPT | Performed by: OBSTETRICS & GYNECOLOGY

## 2024-09-10 PROCEDURE — 36000708 HC OR TIME LEV III 1ST 15 MIN: Performed by: OBSTETRICS & GYNECOLOGY

## 2024-09-10 PROCEDURE — 86900 BLOOD TYPING SEROLOGIC ABO: CPT | Performed by: OBSTETRICS & GYNECOLOGY

## 2024-09-10 PROCEDURE — 51702 INSERT TEMP BLADDER CATH: CPT

## 2024-09-10 PROCEDURE — 27201423 OPTIME MED/SURG SUP & DEVICES STERILE SUPPLY: Performed by: OBSTETRICS & GYNECOLOGY

## 2024-09-10 PROCEDURE — 85018 HEMOGLOBIN: CPT | Performed by: OBSTETRICS & GYNECOLOGY

## 2024-09-10 PROCEDURE — 86923 COMPATIBILITY TEST ELECTRIC: CPT | Mod: 91 | Performed by: OBSTETRICS & GYNECOLOGY

## 2024-09-10 PROCEDURE — 63600175 PHARM REV CODE 636 W HCPCS: Mod: JZ,JG | Performed by: STUDENT IN AN ORGANIZED HEALTH CARE EDUCATION/TRAINING PROGRAM

## 2024-09-10 PROCEDURE — 36620 INSERTION CATHETER ARTERY: CPT | Performed by: STUDENT IN AN ORGANIZED HEALTH CARE EDUCATION/TRAINING PROGRAM

## 2024-09-10 PROCEDURE — 37000008 HC ANESTHESIA 1ST 15 MINUTES: Performed by: OBSTETRICS & GYNECOLOGY

## 2024-09-10 RX ORDER — MISOPROSTOL 100 UG/1
800 TABLET ORAL ONCE AS NEEDED
Status: DISCONTINUED | OUTPATIENT
Start: 2024-09-10 | End: 2024-09-11

## 2024-09-10 RX ORDER — ONDANSETRON HYDROCHLORIDE 2 MG/ML
INJECTION, SOLUTION INTRAVENOUS
Status: DISCONTINUED | OUTPATIENT
Start: 2024-09-10 | End: 2024-09-10

## 2024-09-10 RX ORDER — DIPHENOXYLATE HYDROCHLORIDE AND ATROPINE SULFATE 2.5; .025 MG/1; MG/1
2 TABLET ORAL EVERY 6 HOURS PRN
Status: DISCONTINUED | OUTPATIENT
Start: 2024-09-10 | End: 2024-09-11

## 2024-09-10 RX ORDER — OXYTOCIN 10 [USP'U]/ML
INJECTION, SOLUTION INTRAMUSCULAR; INTRAVENOUS
Status: DISCONTINUED | OUTPATIENT
Start: 2024-09-10 | End: 2024-09-10

## 2024-09-10 RX ORDER — DOCUSATE SODIUM 100 MG/1
200 CAPSULE, LIQUID FILLED ORAL 2 TIMES DAILY
Status: DISCONTINUED | OUTPATIENT
Start: 2024-09-10 | End: 2024-09-13 | Stop reason: HOSPADM

## 2024-09-10 RX ORDER — ACETAMINOPHEN 325 MG/1
650 TABLET ORAL EVERY 4 HOURS PRN
Status: DISCONTINUED | OUTPATIENT
Start: 2024-09-10 | End: 2024-09-13 | Stop reason: HOSPADM

## 2024-09-10 RX ORDER — METOPROLOL SUCCINATE 25 MG/1
25 TABLET, EXTENDED RELEASE ORAL DAILY
Status: DISCONTINUED | OUTPATIENT
Start: 2024-09-10 | End: 2024-09-13 | Stop reason: HOSPADM

## 2024-09-10 RX ORDER — BUPIVACAINE HYDROCHLORIDE 7.5 MG/ML
INJECTION, SOLUTION EPIDURAL; RETROBULBAR
Status: COMPLETED | OUTPATIENT
Start: 2024-09-10 | End: 2024-09-10

## 2024-09-10 RX ORDER — KETOROLAC TROMETHAMINE 30 MG/ML
30 INJECTION, SOLUTION INTRAMUSCULAR; INTRAVENOUS EVERY 6 HOURS
Status: DISCONTINUED | OUTPATIENT
Start: 2024-09-10 | End: 2024-09-10

## 2024-09-10 RX ORDER — ACETAMINOPHEN 10 MG/ML
INJECTION, SOLUTION INTRAVENOUS
Status: DISCONTINUED | OUTPATIENT
Start: 2024-09-10 | End: 2024-09-10

## 2024-09-10 RX ORDER — MISOPROSTOL 100 UG/1
400 TABLET ORAL ONCE
Status: COMPLETED | OUTPATIENT
Start: 2024-09-11 | End: 2024-09-11

## 2024-09-10 RX ORDER — MORPHINE SULFATE 0.5 MG/ML
INJECTION, SOLUTION EPIDURAL; INTRATHECAL; INTRAVENOUS
Status: DISCONTINUED | OUTPATIENT
Start: 2024-09-10 | End: 2024-09-10

## 2024-09-10 RX ORDER — SODIUM CHLORIDE 0.9 % (FLUSH) 0.9 %
2 SYRINGE (ML) INJECTION
Status: DISCONTINUED | OUTPATIENT
Start: 2024-09-10 | End: 2024-09-13 | Stop reason: HOSPADM

## 2024-09-10 RX ORDER — MUPIROCIN 20 MG/G
OINTMENT TOPICAL
Status: DISCONTINUED | OUTPATIENT
Start: 2024-09-10 | End: 2024-09-10 | Stop reason: HOSPADM

## 2024-09-10 RX ORDER — FAMOTIDINE 10 MG/ML
20 INJECTION INTRAVENOUS
Status: DISCONTINUED | OUTPATIENT
Start: 2024-09-10 | End: 2024-09-11

## 2024-09-10 RX ORDER — TRANEXAMIC ACID 100 MG/ML
INJECTION, SOLUTION INTRAVENOUS
Status: DISCONTINUED | OUTPATIENT
Start: 2024-09-10 | End: 2024-09-10

## 2024-09-10 RX ORDER — SIMETHICONE 80 MG
1 TABLET,CHEWABLE ORAL EVERY 4 HOURS PRN
Status: DISCONTINUED | OUTPATIENT
Start: 2024-09-10 | End: 2024-09-13 | Stop reason: HOSPADM

## 2024-09-10 RX ORDER — OXYTOCIN-SODIUM CHLORIDE 0.9% IV SOLN 30 UNIT/500ML 30-0.9/5 UT/ML-%
95 SOLUTION INTRAVENOUS ONCE
Status: DISCONTINUED | OUTPATIENT
Start: 2024-09-10 | End: 2024-09-10

## 2024-09-10 RX ORDER — DIPHENHYDRAMINE HCL 25 MG
25 CAPSULE ORAL EVERY 4 HOURS PRN
Status: DISCONTINUED | OUTPATIENT
Start: 2024-09-10 | End: 2024-09-13 | Stop reason: HOSPADM

## 2024-09-10 RX ORDER — OXYTOCIN 10 [USP'U]/ML
10 INJECTION, SOLUTION INTRAMUSCULAR; INTRAVENOUS ONCE AS NEEDED
Status: COMPLETED | OUTPATIENT
Start: 2024-09-10 | End: 2024-09-10

## 2024-09-10 RX ORDER — ADHESIVE BANDAGE
30 BANDAGE TOPICAL 2 TIMES DAILY PRN
Status: DISCONTINUED | OUTPATIENT
Start: 2024-09-11 | End: 2024-09-13 | Stop reason: HOSPADM

## 2024-09-10 RX ORDER — ALUMINUM HYDROXIDE, MAGNESIUM HYDROXIDE, AND SIMETHICONE 1200; 120; 1200 MG/30ML; MG/30ML; MG/30ML
30 SUSPENSION ORAL EVERY 6 HOURS PRN
Status: DISCONTINUED | OUTPATIENT
Start: 2024-09-10 | End: 2024-09-11

## 2024-09-10 RX ORDER — IBUPROFEN 600 MG/1
600 TABLET ORAL EVERY 6 HOURS
Status: DISCONTINUED | OUTPATIENT
Start: 2024-09-11 | End: 2024-09-13 | Stop reason: HOSPADM

## 2024-09-10 RX ORDER — MISOPROSTOL 100 UG/1
400 TABLET ORAL ONCE
Status: COMPLETED | OUTPATIENT
Start: 2024-09-10 | End: 2024-09-10

## 2024-09-10 RX ORDER — BISACODYL 10 MG/1
10 SUPPOSITORY RECTAL ONCE AS NEEDED
Status: DISCONTINUED | OUTPATIENT
Start: 2024-09-10 | End: 2024-09-13 | Stop reason: HOSPADM

## 2024-09-10 RX ORDER — MORPHINE SULFATE 10 MG/ML
10 INJECTION INTRAMUSCULAR; INTRAVENOUS; SUBCUTANEOUS
Status: DISCONTINUED | OUTPATIENT
Start: 2024-09-10 | End: 2024-09-13 | Stop reason: HOSPADM

## 2024-09-10 RX ORDER — FENTANYL CITRATE 50 UG/ML
INJECTION, SOLUTION INTRAMUSCULAR; INTRAVENOUS
Status: DISCONTINUED | OUTPATIENT
Start: 2024-09-10 | End: 2024-09-10

## 2024-09-10 RX ORDER — OXYTOCIN 10 [USP'U]/ML
INJECTION, SOLUTION INTRAMUSCULAR; INTRAVENOUS
Status: DISPENSED
Start: 2024-09-10 | End: 2024-09-10

## 2024-09-10 RX ORDER — SODIUM CHLORIDE, SODIUM LACTATE, POTASSIUM CHLORIDE, CALCIUM CHLORIDE 600; 310; 30; 20 MG/100ML; MG/100ML; MG/100ML; MG/100ML
INJECTION, SOLUTION INTRAVENOUS CONTINUOUS
Status: DISCONTINUED | OUTPATIENT
Start: 2024-09-10 | End: 2024-09-11

## 2024-09-10 RX ORDER — METHYLERGONOVINE MALEATE 0.2 MG/ML
200 INJECTION INTRAVENOUS
Status: DISCONTINUED | OUTPATIENT
Start: 2024-09-10 | End: 2024-09-10

## 2024-09-10 RX ORDER — DEXTROSE, SODIUM CHLORIDE, SODIUM LACTATE, POTASSIUM CHLORIDE, AND CALCIUM CHLORIDE 5; .6; .31; .03; .02 G/100ML; G/100ML; G/100ML; G/100ML; G/100ML
INJECTION, SOLUTION INTRAVENOUS CONTINUOUS
Status: DISCONTINUED | OUTPATIENT
Start: 2024-09-10 | End: 2024-09-11

## 2024-09-10 RX ORDER — ONDANSETRON 4 MG/1
8 TABLET, ORALLY DISINTEGRATING ORAL EVERY 8 HOURS PRN
Status: DISCONTINUED | OUTPATIENT
Start: 2024-09-10 | End: 2024-09-11

## 2024-09-10 RX ORDER — OXYTOCIN-SODIUM CHLORIDE 0.9% IV SOLN 30 UNIT/500ML 30-0.9/5 UT/ML-%
95 SOLUTION INTRAVENOUS ONCE AS NEEDED
Status: DISCONTINUED | OUTPATIENT
Start: 2024-09-10 | End: 2024-09-13 | Stop reason: HOSPADM

## 2024-09-10 RX ORDER — MORPHINE SULFATE 4 MG/ML
4 INJECTION, SOLUTION INTRAMUSCULAR; INTRAVENOUS
Status: DISCONTINUED | OUTPATIENT
Start: 2024-09-10 | End: 2024-09-13 | Stop reason: HOSPADM

## 2024-09-10 RX ORDER — CEFAZOLIN SODIUM 2 G/50ML
2 SOLUTION INTRAVENOUS
Status: DISCONTINUED | OUTPATIENT
Start: 2024-09-10 | End: 2024-09-11

## 2024-09-10 RX ORDER — CARBOPROST TROMETHAMINE 250 UG/ML
250 INJECTION, SOLUTION INTRAMUSCULAR
Status: DISCONTINUED | OUTPATIENT
Start: 2024-09-10 | End: 2024-09-10

## 2024-09-10 RX ORDER — MISOPROSTOL 100 UG/1
800 TABLET ORAL
Status: DISCONTINUED | OUTPATIENT
Start: 2024-09-10 | End: 2024-09-11

## 2024-09-10 RX ORDER — OXYTOCIN-SODIUM CHLORIDE 0.9% IV SOLN 30 UNIT/500ML 30-0.9/5 UT/ML-%
10 SOLUTION INTRAVENOUS ONCE AS NEEDED
Status: DISCONTINUED | OUTPATIENT
Start: 2024-09-10 | End: 2024-09-13 | Stop reason: HOSPADM

## 2024-09-10 RX ORDER — DIPHENHYDRAMINE HYDROCHLORIDE 50 MG/ML
25 INJECTION INTRAMUSCULAR; INTRAVENOUS EVERY 4 HOURS PRN
Status: DISCONTINUED | OUTPATIENT
Start: 2024-09-10 | End: 2024-09-13 | Stop reason: HOSPADM

## 2024-09-10 RX ORDER — KETOROLAC TROMETHAMINE 30 MG/ML
30 INJECTION, SOLUTION INTRAMUSCULAR; INTRAVENOUS EVERY 6 HOURS
Status: COMPLETED | OUTPATIENT
Start: 2024-09-10 | End: 2024-09-10

## 2024-09-10 RX ORDER — OXYTOCIN-SODIUM CHLORIDE 0.9% IV SOLN 30 UNIT/500ML 30-0.9/5 UT/ML-%
10 SOLUTION INTRAVENOUS ONCE
Status: DISCONTINUED | OUTPATIENT
Start: 2024-09-10 | End: 2024-09-11

## 2024-09-10 RX ORDER — OXYTOCIN 10 [USP'U]/ML
INJECTION, SOLUTION INTRAMUSCULAR; INTRAVENOUS
Status: DISCONTINUED
Start: 2024-09-10 | End: 2024-09-10 | Stop reason: WASHOUT

## 2024-09-10 RX ORDER — OXYTOCIN-SODIUM CHLORIDE 0.9% IV SOLN 30 UNIT/500ML 30-0.9/5 UT/ML-%
95 SOLUTION INTRAVENOUS ONCE
Status: COMPLETED | OUTPATIENT
Start: 2024-09-10 | End: 2024-09-10

## 2024-09-10 RX ORDER — SODIUM CITRATE AND CITRIC ACID MONOHYDRATE 334; 500 MG/5ML; MG/5ML
30 SOLUTION ORAL
Status: DISCONTINUED | OUTPATIENT
Start: 2024-09-10 | End: 2024-09-11

## 2024-09-10 RX ORDER — IBUPROFEN 600 MG/1
600 TABLET ORAL EVERY 6 HOURS
Status: DISCONTINUED | OUTPATIENT
Start: 2024-09-11 | End: 2024-09-10

## 2024-09-10 RX ORDER — CEFAZOLIN SODIUM 1 G/3ML
INJECTION, POWDER, FOR SOLUTION INTRAMUSCULAR; INTRAVENOUS
Status: DISCONTINUED | OUTPATIENT
Start: 2024-09-10 | End: 2024-09-10

## 2024-09-10 RX ADMIN — ONDANSETRON 8 MG: 4 TABLET, ORALLY DISINTEGRATING ORAL at 02:09

## 2024-09-10 RX ADMIN — MORPHINE SULFATE 0.15 MG: 0.5 INJECTION, SOLUTION EPIDURAL; INTRATHECAL; INTRAVENOUS at 07:09

## 2024-09-10 RX ADMIN — SODIUM CHLORIDE, POTASSIUM CHLORIDE, SODIUM LACTATE AND CALCIUM CHLORIDE 1000 ML: 600; 310; 30; 20 INJECTION, SOLUTION INTRAVENOUS at 05:09

## 2024-09-10 RX ADMIN — KETOROLAC TROMETHAMINE 30 MG: 30 INJECTION, SOLUTION INTRAMUSCULAR at 08:09

## 2024-09-10 RX ADMIN — TRANEXAMIC ACID 1000 MG: 100 INJECTION, SOLUTION INTRAVENOUS at 07:09

## 2024-09-10 RX ADMIN — OXYTOCIN 10 UNITS: 10 INJECTION, SOLUTION INTRAMUSCULAR; INTRAVENOUS at 07:09

## 2024-09-10 RX ADMIN — DOCUSATE SODIUM 200 MG: 100 CAPSULE, LIQUID FILLED ORAL at 09:09

## 2024-09-10 RX ADMIN — MISOPROSTOL 400 MCG: 100 TABLET ORAL at 05:09

## 2024-09-10 RX ADMIN — KETOROLAC TROMETHAMINE 30 MG: 30 INJECTION, SOLUTION INTRAMUSCULAR at 05:09

## 2024-09-10 RX ADMIN — CEFAZOLIN 2 G: 330 INJECTION, POWDER, FOR SOLUTION INTRAMUSCULAR; INTRAVENOUS at 07:09

## 2024-09-10 RX ADMIN — SODIUM CITRATE AND CITRIC ACID MONOHYDRATE 30 ML: 500; 334 SOLUTION ORAL at 06:09

## 2024-09-10 RX ADMIN — ACETAMINOPHEN 1000 MG: 10 INJECTION, SOLUTION INTRAVENOUS at 07:09

## 2024-09-10 RX ADMIN — METOPROLOL SUCCINATE 25 MG: 25 TABLET, EXTENDED RELEASE ORAL at 08:09

## 2024-09-10 RX ADMIN — ONDANSETRON 4 MG: 2 INJECTION INTRAMUSCULAR; INTRAVENOUS at 07:09

## 2024-09-10 RX ADMIN — BUPIVACAINE HYDROCHLORIDE 1.6 ML: 7.5 INJECTION, SOLUTION EPIDURAL; RETROBULBAR at 07:09

## 2024-09-10 RX ADMIN — OXYTOCIN 30 UNITS: 10 INJECTION, SOLUTION INTRAMUSCULAR; INTRAVENOUS at 07:09

## 2024-09-10 RX ADMIN — MORPHINE SULFATE 4 MG: 4 INJECTION, SOLUTION INTRAMUSCULAR; INTRAVENOUS at 05:09

## 2024-09-10 RX ADMIN — ONDANSETRON 8 MG: 4 TABLET, ORALLY DISINTEGRATING ORAL at 09:09

## 2024-09-10 RX ADMIN — FENTANYL CITRATE 10 MCG: 50 INJECTION, SOLUTION INTRAMUSCULAR; INTRAVENOUS at 07:09

## 2024-09-10 RX ADMIN — MORPHINE SULFATE 4 MG: 4 INJECTION, SOLUTION INTRAMUSCULAR; INTRAVENOUS at 09:09

## 2024-09-10 RX ADMIN — KETOROLAC TROMETHAMINE 30 MG: 30 INJECTION, SOLUTION INTRAMUSCULAR at 11:09

## 2024-09-10 RX ADMIN — Medication 95 MILLI-UNITS/MIN: at 12:09

## 2024-09-10 RX ADMIN — MORPHINE SULFATE 4 MG: 4 INJECTION, SOLUTION INTRAMUSCULAR; INTRAVENOUS at 02:09

## 2024-09-10 NOTE — L&D DELIVERY NOTE
[facility] - Periop Services   Section   Operative Note    SUMMARY     Date of Procedure: 9/10/2024     Procedure: Procedure(s) (LRB):   SECTION (N/A)    Surgeons and Role:     * Joe Aly MD - Primary     * Jr Raheem Calle MD - Assisting     * Jose Miguel Arias MD        Pre-Operative Diagnosis: Hereditary disease in family possibly affecting fetus, fetus 1 [O35.2XX1]  Pregnancy affected by genitourinary abnormality of fetus, single or unspecified fetus [O35.EXX0]    Post-Operative Diagnosis: Post-Op Diagnosis Codes:     * Hereditary disease in family possibly affecting fetus, fetus 1 [O35.2XX1]     * Pregnancy affected by genitourinary abnormality of fetus, single or unspecified fetus [O35.EXX0]    Anesthesia: Spinal/Epidural    Technical Procedures Used: ltcs           Description of the Findings of the Procedure: personal history of ACTA2 gene mutation    Significant Surgical Tasks Conducted by the Assistant(s), if Applicable: assisted with opening , delivery of baby and closure    Complications: No    Blood Loss: * No values recorded between 9/10/2024 12:00 AM and 9/10/2024  8:09 AM *     With patient in supine position, the legs are  and Simons Catheter placed and positioning to supine done.   Abdomen prepped with Chloroprep and 3 minute drying time allowed prior to draping of the abdomen.   Time out taken with OR team members.  Pfannenstiel Incision made through the skin, transverse fascial incision developed, rectus muscles  in the midline and the peritoneum entered.   no adhesions noted.  The lower uterine segment and position of the fetus identified.   Bladder flap taken down through transverse peritoneal incision.    Low Transverse Incision made through well developer lower uterine segment and extended laterally with blunt dissection.   Clear fluid noted.  Infant delivered from vertex presentation.  Cord clamped after one minute and   handed to attending nurse.  Cord blood taken, placenta delivered.  The uterus was exteriorized.  The edges of the uterine incision are grasped with Leonardo clamps at the angles and the inferior and superior midline edges of the incision.    Closure with running lock 0 Chromic, starting at each angle, tying in the midline.   Observation for bleeding with suture of any bleeding along the hysterotomy line.   With good hemostasis noted, the anterior pelvis is rinsed with sterile saline.   Right and left adnexa with normal anatomy.     Closure of the abdomen with 2 0 Vicryl running of the peritoneum, fascial closure with 0 looped PDS starting at the right angle and tying the knot at the left angle.  Skin closure with 4 0 Vicryl subcuticular.  Wound dressed with telfa/tegaderm.          Specimens:   Specimen (24h ago, onward)      None            Condition: Good    VTE Risk Mitigation (From admission, onward)           Ordered     IP VTE HIGH RISK PATIENT  Once         09/10/24 0534     Place sequential compression device  Until discontinued         09/10/24 0534                    Disposition: PACU - hemodynamically stable.    Attestation: Good         Delivery Information for Ketan Simms    Birth information:  YOB: 2026   Time of birth: 7:36 AM   Sex: male   Head Delivery Date/Time: 9/10/2024  7:36 AM   Delivery type: , Low Transverse   Gestational Age: 38w3d        Delivery Providers    Delivering clinician: Joe Aly MD   Provider Role    Meli Brown, RN Delivery Nurse              Measurements    Weight:   Length:          Apgars    Living status: Living  Apgar Component Scores:  1 min.:  5 min.:  10 min.:  15 min.:  20 min.:    Skin color:         Heart rate:         Reflex irritability:         Muscle tone:         Respiratory effort:         Total:                  Operative Delivery    Forceps attempted?: No  Vacuum extractor attempted?: No         Shoulder Dystocia     Shoulder dystocia present?: No           Presentation    Presentation: Vertex           Interventions/Resuscitation           Cord    Vessels: 3 vessels  Complications: Nuchal  Nuchal Cord Description: loose nuchal cord  Number of Loops: 1  Delayed Cord Clamping?: No  Cord Blood Disposition: Sent with Baby  Gases Sent?: No  Stem Cell Collection (by MD): No       Placenta    Placenta delivery date/time: 9/10/2024 0737  Placenta removal: Spontaneous  Placenta appearance: Intact  Placenta disposition: Discarded           Labor Events:       labor: No     Labor Onset Date/Time:         Dilation Complete Date/Time:         Start Pushing Date/Time:       Rupture Date/Time: 09/10/24  0735         Rupture type: ARM (Artificial Rupture)         Fluid Amount:       Fluid Color: Clear       steroids: None     Antibiotics given for GBS: No     Induction:       Indications for induction:        Augmentation:       Indications for augmentation:       Labor complications: None     Additional complications:          Cervical ripening:                     Delivery:      Episiotomy: None     Indication for Episiotomy:       Perineal Lacerations: None Repaired:      Periurethral Laceration:   Repaired:     Labial Laceration:   Repaired:     Sulcus Laceration:   Repaired:     Vaginal Laceration:   Repaired:     Cervical Laceration:   Repaired:     Repair suture: None     Repair # of packets:       Last Value - EBL - Nursing (mL):       Sum - EBL - Nursing (mL): 0     Last Value - EBL - Anesthesia (mL):      Calculated QBL (mL): 620      Running total QBL (mL): 620      Vaginal Sweep Performed: No     Surgicount Correct: Yes       Other providers:       Anesthesia    Method: Spinal          Details (if applicable):  Trial of Labor No    Categorization: Primary    Priority: Routine   Indications for : Other (Add Comments)   Incision Type: low transverse     Additional   information:  Forceps:    Vacuum:    Breech:    Observed anomalies    Other (Comments):

## 2024-09-10 NOTE — ANESTHESIA PROCEDURE NOTES
Arterial    Diagnosis: intrauterine pregnancy    Patient location during procedure: pre-op  Timeout: 9/10/2024 6:50 AM  Procedure end time: 9/10/2024 6:53 AM    Staffing  Authorizing Provider: Stan Christopher MD  Performing Provider: Stan Christopher MD    Staffing  Performed by: Stan Christopher MD  Authorized by: Stan Christopher MD    Anesthesiologist was present at the time of the procedure.    Preanesthetic Checklist  Completed: patient identified, IV checked, site marked, risks and benefits discussed, surgical consent, monitors and equipment checked, pre-op evaluation, timeout performed and anesthesia consent givenArterial  Skin Prep: chlorhexidine gluconate  Local Infiltration: lidocaine  Orientation: right  Location: radial    Catheter Size: 20 G  Catheter placement by Ultrasound guidance. Heme positive aspiration all ports.   Vessel Caliber: medium, patent, compressibility normal  Vascular Doppler:  not done  Needle advanced into vessel with real time Ultrasound guidance.Insertion Attempts: 1  Assessment  Dressing: secured with tape and tegaderm  Patient: Tolerated well

## 2024-09-10 NOTE — CONSULTS
Consultation Note  Maternal Fetal Medicine          Subjective:         Ade Simms is a 29 y.o.  female with IUP at 38w3d admitted for term pregnancy in the setting of ACTA2 mutation (heritable aortopathy condition), S/p C/S this AM.    Ade is well known to the [facility] service due to her history of maternal cardiac disease that puts her at increased risk for aortic dissection.  She is feeling well this morning and has no current complaints.  She has no chest pain, shortness of breath.  Blood pressures are normal.  She took her metoprolol last yesterday.  She just underwent  section and said she is feeling well at this time.    PMHx:   Past Medical History:   Diagnosis Date    Family history of genetic disease     Dad has tested positive for: familial thoracic aortic aneurysm and dissection (FTAAD)       PSHx:   Past Surgical History:   Procedure Laterality Date    CHOLECYSTECTOMY      DILATION AND CURETTAGE OF UTERUS         All: Review of patient's allergies indicates:  No Known Allergies    Meds:   Medications Prior to Admission   Medication Sig Dispense Refill Last Dose    aspirin 81 MG Chew Take 81 mg by mouth once daily.   2024    metoprolol succinate (TOPROL-XL) 25 MG 24 hr tablet Take 25 mg by mouth once daily.   2024    omeprazole (PRILOSEC) 20 MG capsule Take 1 capsule (20 mg total) by mouth 2 (two) times a day. 60 capsule 3 2024    prenatal vit/iron fum/folic ac (PRENATAL 1+1 ORAL) Take by mouth.   2024       SH:   Social History     Socioeconomic History    Marital status:    Occupational History    Occupation: O.T[employer]   Tobacco Use    Smoking status: Never    Smokeless tobacco: Never   Substance and Sexual Activity    Alcohol use: Not Currently    Drug use: Never    Sexual activity: Yes     Partners: Male       FH:   Family History   Problem Relation Name Age of Onset    Aortic dissection Father      Aortic dissection Sister         OBHx:   OB History     Para Term  AB Living   2 1 1 0 1 1   SAB IAB Ectopic Multiple Live Births   1 0 0 0 1      # Outcome Date GA Lbr Charles/2nd Weight Sex Type Anes PTL Lv   2 Term 09/10/24 38w3d  3.6 kg (7 lb 15 oz) M CS-LTranv Spinal N TORSTEN      Name: Ketan Simms      Apgar1: 8  Apgar5: 8   1 SAB 2023 6w0d    SAB         Birth Comments: D&C       Objective:         Temp:  [97.2 °F (36.2 °C)-98 °F (36.7 °C)] 97.2 °F (36.2 °C)  Pulse:  [60-99] 73  Resp:  [10-20] 16  SpO2:  [96 %-100 %] 98 %  BP: (101-139)/() 139/89    Gen: NAD, A&Ox3  Pulm: Unlabored breathing, LCTAB  Card: RRR  Abd: abdomen appropriately tender to palpation, incision with bandage c/d/i  Extremities: Palpable peripheral pulses, no pedal edema,  DTRs x 4    Lab Review  Blood Type: A NEG    Recent Results (from the past 24 hour(s))   Type & Screen    Collection Time: 09/10/24 10:02 AM   Result Value Ref Range    Group & Rh A NEG     Indirect Renea GEL NEG     Specimen Outdate 2024 23:59        Assessment:       29 y.o.  at 38w3d weeks gestation admitted for term pregnancy in the setting of ACTA2 mutation (heritable aortopathy condition), S/p C/S this AM.    Active Hospital Problems    Diagnosis  POA    *38 weeks gestation of pregnancy [Z3A.38]  Not Applicable    Monoallelic mutation of ACTA2 gene [Z15.89]  Not Applicable    Delivery by elective  section [O82]  Unknown    - Hereditary disease in family possibly affecting fetus, fetus 1 [O35.2XX1]  Yes      Resolved Hospital Problems   No resolved problems to display.        Plan:     1. Maternal cardiac disease (POA)  She has an ACTA2 gene mutation which increases her risk for an aortic dissection. Her father and her sister also have this gene mutation. Her father had an aortic dissection at the age of 50. Her sister had a complicated type B dissection at the age of 26 and survived. Her sister had three successful pregnancies.   She's been followed by a cardiologist at  the [facility]. She had normal aortic imaging approx 6-7 yrs ago. Her last evaluation was 1/31/24 where echo results were normal (Aortic root 3.0; EF 60%).     Mutations in ACTA2 (codes for smooth muscle specific alpha-actin, a critical component of the contractile apparatus of the vascular smooth muscle cell) predispose to thoracic aortic aneurysms and dissection as well as coronary artery and cerebrovascular disease. In one case control study, 53 women with this mutation were followed in 137 pregnancies.  eight had aortic dissections in the third trimester or the postpartum period (6% of pregnancies). One woman also had a myocardial infarct that occurred during pregnancy that was independent of her aortic dissection. Compared to the population-based frequency of peripartum aortic dissections of 0.6%, the rate of peripartum aortic dissections in women with ACTA2 mutations is much higher (8 out of 39; 20%). Six of these dissections initiated in the ascending aorta (Anthony type A), three were fatal. Three women had ascending aortic dissections at diameters less that 5.0?cm (range 3.8-4.7?cm). Aortic pathology showed mild to moderate medial degeneration of the aorta in three women. Of note, five of the women had hypertension either during or before the pregnancy. In summary, the majority of women with ACTA2 mutations did not have aortic or other vascular complications with pregnancy. However, these findings show that pregnancy is associated with significant risk for aortic dissection in women with ACTA2 mutations. Women with ACTA2 mutations who are planning to get pregnant should be counseled about this risk of aortic dissection, and proper clinical management should be initiated to reduce this risk.  Other associated features of this mutation may be ocular abnormalities (iris flocculi) and skin manifestations (livedo reticularis).     Recommendations:  - Recommend beta blockade.  Continue Metoprolol  (ordered). OK to stop ASA. Tight BP control with goal of 120/80.  - Telemetry x 24 hrs postpartum on antepartum  - Monitor for PPH- Avoid methergine and Hemabate and try to use cytotec, TXA as able.  - Precautions for CP, SOA, arrhythmia, vital sign changes, BP elevations.   - OK to breastfeed.    2. A neg  -  blood type w/u for possible rhogam      Thank you for allowing us to participate in the care of your patient. If you have any questions/concerns, please do not hesitate to contact us.     Meli Samayoa MD  Maternal Fetal Medicine   [phone]

## 2024-09-10 NOTE — H&P
[facility] - Labor and Delivery  Obstetrics  History & Physical    Patient Name: Ade Simms  MRN: [id number]  Admission Date: 9/10/2024  Primary Care Provider: Fidel Oakes MD    Subjective:     Principal Problem:38 weeks gestation of pregnancy    History of Present Illness:   2 para 0 AB1 currently at 38 weeks and 3 days gestation.  The patient is admitted this morning for elective  delivery.  She has been followed during this pregnancy with Maternal-Fetal Medicine.  The patient has a history of a monoallelic mutation of the ACTA2 gene with increased risk of dissecting aneurysm of the aorta.  Her father has a history of aortic aneurysm previously which prompted testing of the patient.  She has considered at increased risk for possible MI/aortic aneurysm in the  and postpartum period.  Measures have been taken to notify the cardiovascular surgeon her cardiologist and the intensivist team at the hospital to be on alert during her stay for her delivery and postpartum recovery.    Obstetric HPI:  Patient reports None contractions, active fetal movement, No vaginal bleeding , No loss of fluid     This pregnancy has been complicated by ACTA2 gene mutation.    OB History    Para Term  AB Living   2 0 0 0 1 0   SAB IAB Ectopic Multiple Live Births   1 0 0 0 0      # Outcome Date GA Lbr Charles/2nd Weight Sex Type Anes PTL Lv   2 Current            1 SAB 2023 6w0d    SAB         Birth Comments: D&C     Past Medical History:   Diagnosis Date    Family history of genetic disease     Dad has tested positive for: familial thoracic aortic aneurysm and dissection (FTAAD)     Past Surgical History:   Procedure Laterality Date    CHOLECYSTECTOMY      DILATION AND CURETTAGE OF UTERUS         PTA Medications   Medication Sig    aspirin 81 MG Chew Take 81 mg by mouth once daily.    metoprolol succinate (TOPROL-XL) 25 MG 24 hr tablet Take 25 mg by mouth once daily.     omeprazole (PRILOSEC) 20 MG capsule Take 1 capsule (20 mg total) by mouth 2 (two) times a day.    prenatal vit/iron fum/folic ac (PRENATAL 1+1 ORAL) Take by mouth.       Review of patient's allergies indicates:  No Known Allergies     Family History       Problem Relation (Age of Onset)    Aortic dissection Father, Sister          Tobacco Use    Smoking status: Never    Smokeless tobacco: Never   Substance and Sexual Activity    Alcohol use: Not Currently    Drug use: Never    Sexual activity: Yes     Partners: Male     Review of Systems   All other systems reviewed and are negative.     Objective:     Vital Signs (Most Recent):  Temp: 98 °F (36.7 °C) (09/10/24 0601)  Pulse: 91 (09/10/24 06)  BP: 135/85 (09/10/24 0553)  SpO2: 98 % (09/10/24 0602) Vital Signs (24h Range):  Temp:  [98 °F (36.7 °C)] 98 °F (36.7 °C)  Pulse:  [87-99] 91  SpO2:  [96 %-98 %] 98 %  BP: (135)/(85) 135/85     Weight: 116.1 kg (256 lb)  Body mass index is 37.8 kg/m².    FHT:  145 Cat 0 (reassuring)  TOCO:  Q maxnone minutes    Physical Exam:   Constitutional: She appears well-developed and well-nourished.    HENT:   Head: Atraumatic.    Eyes: Pupils are equal, round, and reactive to light.     Cardiovascular:  Intact distal pulses.             Pulmonary/Chest: Breath sounds normal.        Abdominal: Bowel sounds are normal.     Genitourinary:    Vagina normal.             Musculoskeletal: Normal range of motion.       Neurological: She is alert.    Skin: Skin is warm and dry.    Psychiatric: She has a normal mood and affect.       Cervix:  Dilation:  1  Effacement:  50%  Station: -2  Presentation: Vertex     Significant Labs:  Lab Results   Component Value Date    GROUPTRH A NEG 2024    HEPBSAG Nonreactive 2024       I have personallly reviewed all pertinent lab results from the last 24 hours.    Assessment/Plan:     29 y.o. female  at 38w3d for:    Active Diagnoses:    Diagnosis Date Noted POA    PRINCIPAL PROBLEM:  38  weeks gestation of pregnancy [Z3A.38] 09/10/2024 Not Applicable    Monoallelic mutation of ACTA2 gene [Z15.89] 09/10/2024 Not Applicable    - Hereditary disease in family possibly affecting fetus, fetus 1 [O35.2XX1] 2024 Yes      Problems Resolved During this Admission:       Thirty-eight weeks and 3 days gestation admitted for an elective  delivery.  Preparations have been made for the patient's delivery and postpartum care with the intensivist team notified as well as cardiovascular surgery and Cardiology.    Joe Aly MD  Obstetrics  [facility] - Labor and Delivery

## 2024-09-10 NOTE — ANESTHESIA PREPROCEDURE EVALUATION
09/10/2024  Ade Simms is a 29 y.o., female.    ACTA2 gene, puts her at risk for aortic dissection  Father with hx of dissection  12.5 / 35 / 190k, type and screen active  Two large bore Ivs, art line, SAB    TTE 7/24  Summary    Left Ventricle: The left ventricle is normal in size. Normal wall thickness. There is normal systolic function with a visually estimated ejection fraction of 60 - 65%. There is normal diastolic function.    Right Ventricle: Normal right ventricular cavity size. Systolic function is normal.    Left Atrium: Left atrium is mildly dilated.    Pulmonic Valve: There is mild regurgitation with a centrally directed jet.    Aorta: Aortic root is normal in size. Ascending aorta is normal measuring 3.1 cm. Descending aorta is normal.        Pre-op Assessment    I have reviewed the Patient Summary Reports.     I have reviewed the Nursing Notes. I have reviewed the NPO Status.   I have reviewed the Medications.     Review of Systems  Anesthesia Hx:               Denies Personal Hx of Anesthesia complications.                    Cardiovascular:  Exercise tolerance: good                                           Pulmonary:  Pulmonary Normal                       Hepatic/GI:     GERD, well controlled             Endocrine:        Obesity / BMI > 30      Physical Exam  General: Well nourished, Cooperative and Alert    Airway:  Mallampati: II   Mouth Opening: Normal  TM Distance: Normal  Tongue: Normal    Dental:  Intact    Chest/Lungs:  Normal Respiratory Rate        Anesthesia Plan  Type of Anesthesia, risks & benefits discussed:    Anesthesia Type: Spinal  Intra-op Monitoring Plan: Standard ASA Monitors and Art Line  Post Op Pain Control Plan: intrathecal opioid  Induction:  IV  Informed Consent: Informed consent signed with the Patient and all parties understand the risks and agree  with anesthesia plan.  All questions answered. Patient consented to blood products? Yes  ASA Score: 3  Day of Surgery Review of History & Physical: H&P Update referred to the surgeon/provider.    Ready For Surgery From Anesthesia Perspective.     .

## 2024-09-10 NOTE — ANESTHESIA PROCEDURE NOTES
Spinal    Diagnosis: iup  Patient location during procedure: OB  Start time: 9/10/2024 7:10 AM  Timeout: 9/10/2024 7:10 AM  End time: 9/10/2024 7:14 AM    Staffing  Authorizing Provider: Stan Christopher MD  Performing Provider: Stan Christopher MD    Staffing  Performed by: Stan Christopher MD  Authorized by: Stan Christopher MD    Preanesthetic Checklist  Completed: patient identified, IV checked, site marked, risks and benefits discussed, surgical consent, monitors and equipment checked, pre-op evaluation and timeout performed  Spinal Block  Patient position: sitting  Prep: ChloraPrep  Patient monitoring: heart rate  Approach: midline  Location: L3-4  Injection technique: single shot  CSF Fluid: clear free-flowing CSF  Needle  Needle type: Isabelle   Needle gauge: 25 G  Needle length: 3.5 in  Needle localization: anatomical landmarks  Assessment  Sensory level: T4   Dermatomal levels determined by ice  Ease of block: easy  Patient's tolerance of the procedure: comfortable throughout block  Additional Notes  Straightforward SAB, one pass, + CSF egress, + aspiration pre and post injection, no complications, no paresthesias.'    Medications:    Medications: bupivacaine (pf) (MARCAINE) injection 0.75% - Intraspinal   1.6 mL - 9/10/2024 7:14:00 AM

## 2024-09-10 NOTE — TRANSFER OF CARE
"Anesthesia Transfer of Care Note    Patient: Ade Simms    Procedure(s) Performed: Procedure(s) (LRB):   SECTION (N/A)    Patient location: PACU    Anesthesia Type: spinal    Transport from OR: Transported from OR on room air with adequate spontaneous ventilation    Post pain: adequate analgesia    Post assessment: no apparent anesthetic complications and tolerated procedure well    Post vital signs: stable    Level of consciousness: awake and alert    Nausea/Vomiting: no nausea/vomiting    Complications: none    Transfer of care protocol was followed    Last vitals: Visit Vitals  /89   Pulse 86   Temp 36.7 °C (98 °F) (Oral)   Resp 15   Ht 5' 9" (1.753 m)   Wt 116.1 kg (256 lb)   SpO2 100%   Breastfeeding No   BMI 37.80 kg/m²     "

## 2024-09-11 LAB
BASOPHILS # BLD AUTO: 0.04 X10(3)/MCL
BASOPHILS NFR BLD AUTO: 0.4 %
EOSINOPHIL # BLD AUTO: 0.1 X10(3)/MCL (ref 0–0.9)
EOSINOPHIL NFR BLD AUTO: 1.1 %
ERYTHROCYTE [DISTWIDTH] IN BLOOD BY AUTOMATED COUNT: 13.5 % (ref 11.5–17)
HCT VFR BLD AUTO: 28.5 % (ref 37–47)
HGB BLD-MCNC: 9.9 G/DL (ref 12–16)
IMM GRANULOCYTES # BLD AUTO: 0.04 X10(3)/MCL (ref 0–0.04)
IMM GRANULOCYTES NFR BLD AUTO: 0.4 %
LYMPHOCYTES # BLD AUTO: 1.37 X10(3)/MCL (ref 0.6–4.6)
LYMPHOCYTES NFR BLD AUTO: 14.5 %
MCH RBC QN AUTO: 30 PG (ref 27–31)
MCHC RBC AUTO-ENTMCNC: 34.7 G/DL (ref 33–36)
MCV RBC AUTO: 86.4 FL (ref 80–94)
MONOCYTES # BLD AUTO: 0.86 X10(3)/MCL (ref 0.1–1.3)
MONOCYTES NFR BLD AUTO: 9.1 %
NEUTROPHILS # BLD AUTO: 7.03 X10(3)/MCL (ref 2.1–9.2)
NEUTROPHILS NFR BLD AUTO: 74.5 %
NRBC BLD AUTO-RTO: 0 %
PLATELET # BLD AUTO: 154 X10(3)/MCL (ref 130–400)
PMV BLD AUTO: 10.1 FL (ref 7.4–10.4)
RBC # BLD AUTO: 3.3 X10(6)/MCL (ref 4.2–5.4)
WBC # BLD AUTO: 9.44 X10(3)/MCL (ref 4.5–11.5)

## 2024-09-11 PROCEDURE — 25000003 PHARM REV CODE 250: Performed by: OBSTETRICS & GYNECOLOGY

## 2024-09-11 PROCEDURE — 85025 COMPLETE CBC W/AUTO DIFF WBC: CPT | Performed by: OBSTETRICS & GYNECOLOGY

## 2024-09-11 PROCEDURE — 63600175 PHARM REV CODE 636 W HCPCS: Performed by: OBSTETRICS & GYNECOLOGY

## 2024-09-11 PROCEDURE — 63600519 RHOGAM PHARM REV CODE 636 ALT 250 W HCPCS: Performed by: OBSTETRICS & GYNECOLOGY

## 2024-09-11 PROCEDURE — 3E0234Z INTRODUCTION OF SERUM, TOXOID AND VACCINE INTO MUSCLE, PERCUTANEOUS APPROACH: ICD-10-PCS | Performed by: OBSTETRICS & GYNECOLOGY

## 2024-09-11 PROCEDURE — 36415 COLL VENOUS BLD VENIPUNCTURE: CPT | Performed by: OBSTETRICS & GYNECOLOGY

## 2024-09-11 PROCEDURE — 11000001 HC ACUTE MED/SURG PRIVATE ROOM

## 2024-09-11 RX ORDER — METHYLERGONOVINE MALEATE 0.2 MG/ML
200 INJECTION INTRAVENOUS ONCE AS NEEDED
Status: DISCONTINUED | OUTPATIENT
Start: 2024-09-11 | End: 2024-09-13 | Stop reason: HOSPADM

## 2024-09-11 RX ORDER — OXYCODONE AND ACETAMINOPHEN 5; 325 MG/1; MG/1
1 TABLET ORAL EVERY 4 HOURS PRN
Status: DISCONTINUED | OUTPATIENT
Start: 2024-09-11 | End: 2024-09-13 | Stop reason: HOSPADM

## 2024-09-11 RX ORDER — MISOPROSTOL 100 UG/1
800 TABLET ORAL ONCE AS NEEDED
Status: DISCONTINUED | OUTPATIENT
Start: 2024-09-11 | End: 2024-09-13 | Stop reason: HOSPADM

## 2024-09-11 RX ORDER — OXYTOCIN-SODIUM CHLORIDE 0.9% IV SOLN 30 UNIT/500ML 30-0.9/5 UT/ML-%
95 SOLUTION INTRAVENOUS ONCE AS NEEDED
Status: DISCONTINUED | OUTPATIENT
Start: 2024-09-11 | End: 2024-09-13 | Stop reason: HOSPADM

## 2024-09-11 RX ORDER — MORPHINE SULFATE 4 MG/ML
4 INJECTION, SOLUTION INTRAMUSCULAR; INTRAVENOUS
OUTPATIENT
Start: 2024-09-11

## 2024-09-11 RX ORDER — IBUPROFEN 600 MG/1
600 TABLET ORAL EVERY 6 HOURS
OUTPATIENT
Start: 2024-09-12

## 2024-09-11 RX ORDER — DIPHENHYDRAMINE HCL 25 MG
25 CAPSULE ORAL EVERY 4 HOURS PRN
OUTPATIENT
Start: 2024-09-11

## 2024-09-11 RX ORDER — ACETAMINOPHEN 325 MG/1
650 TABLET ORAL EVERY 4 HOURS PRN
OUTPATIENT
Start: 2024-09-11

## 2024-09-11 RX ORDER — OXYCODONE AND ACETAMINOPHEN 10; 325 MG/1; MG/1
1 TABLET ORAL EVERY 4 HOURS PRN
Status: DISCONTINUED | OUTPATIENT
Start: 2024-09-11 | End: 2024-09-13 | Stop reason: HOSPADM

## 2024-09-11 RX ORDER — BISACODYL 10 MG/1
10 SUPPOSITORY RECTAL ONCE AS NEEDED
OUTPATIENT
Start: 2024-09-11 | End: 2036-02-07

## 2024-09-11 RX ORDER — OXYCODONE AND ACETAMINOPHEN 10; 325 MG/1; MG/1
1 TABLET ORAL EVERY 4 HOURS PRN
Status: DISCONTINUED | OUTPATIENT
Start: 2024-09-11 | End: 2024-09-11

## 2024-09-11 RX ORDER — ALUMINUM HYDROXIDE, MAGNESIUM HYDROXIDE, AND SIMETHICONE 1200; 120; 1200 MG/30ML; MG/30ML; MG/30ML
30 SUSPENSION ORAL EVERY 6 HOURS PRN
Status: DISCONTINUED | OUTPATIENT
Start: 2024-09-11 | End: 2024-09-13 | Stop reason: HOSPADM

## 2024-09-11 RX ORDER — SODIUM CHLORIDE 0.9 % (FLUSH) 0.9 %
2 SYRINGE (ML) INJECTION
OUTPATIENT
Start: 2024-09-11

## 2024-09-11 RX ORDER — OXYTOCIN-SODIUM CHLORIDE 0.9% IV SOLN 30 UNIT/500ML 30-0.9/5 UT/ML-%
95 SOLUTION INTRAVENOUS CONTINUOUS PRN
OUTPATIENT
Start: 2024-09-11

## 2024-09-11 RX ORDER — SIMETHICONE 80 MG
1 TABLET,CHEWABLE ORAL EVERY 4 HOURS PRN
OUTPATIENT
Start: 2024-09-11

## 2024-09-11 RX ORDER — OXYTOCIN 10 [USP'U]/ML
10 INJECTION, SOLUTION INTRAMUSCULAR; INTRAVENOUS ONCE AS NEEDED
Status: DISCONTINUED | OUTPATIENT
Start: 2024-09-11 | End: 2024-09-13 | Stop reason: HOSPADM

## 2024-09-11 RX ORDER — MORPHINE SULFATE 4 MG/ML
10 INJECTION, SOLUTION INTRAMUSCULAR; INTRAVENOUS
OUTPATIENT
Start: 2024-09-11

## 2024-09-11 RX ORDER — OXYTOCIN-SODIUM CHLORIDE 0.9% IV SOLN 30 UNIT/500ML 30-0.9/5 UT/ML-%
20 SOLUTION INTRAVENOUS ONCE AS NEEDED
Status: DISCONTINUED | OUTPATIENT
Start: 2024-09-11 | End: 2024-09-13 | Stop reason: HOSPADM

## 2024-09-11 RX ORDER — OXYCODONE AND ACETAMINOPHEN 5; 325 MG/1; MG/1
1 TABLET ORAL EVERY 4 HOURS PRN
Status: DISCONTINUED | OUTPATIENT
Start: 2024-09-11 | End: 2024-09-11

## 2024-09-11 RX ORDER — ADHESIVE BANDAGE
30 BANDAGE TOPICAL 2 TIMES DAILY PRN
OUTPATIENT
Start: 2024-09-12

## 2024-09-11 RX ORDER — DIPHENOXYLATE HYDROCHLORIDE AND ATROPINE SULFATE 2.5; .025 MG/1; MG/1
2 TABLET ORAL EVERY 6 HOURS PRN
Status: DISCONTINUED | OUTPATIENT
Start: 2024-09-11 | End: 2024-09-13 | Stop reason: HOSPADM

## 2024-09-11 RX ORDER — KETOROLAC TROMETHAMINE 30 MG/ML
30 INJECTION, SOLUTION INTRAMUSCULAR; INTRAVENOUS EVERY 6 HOURS
OUTPATIENT
Start: 2024-09-11 | End: 2024-09-12

## 2024-09-11 RX ORDER — ONDANSETRON 4 MG/1
8 TABLET, ORALLY DISINTEGRATING ORAL EVERY 8 HOURS PRN
Status: DISCONTINUED | OUTPATIENT
Start: 2024-09-11 | End: 2024-09-13 | Stop reason: HOSPADM

## 2024-09-11 RX ORDER — DEXTROSE, SODIUM CHLORIDE, SODIUM LACTATE, POTASSIUM CHLORIDE, AND CALCIUM CHLORIDE 5; .6; .31; .03; .02 G/100ML; G/100ML; G/100ML; G/100ML; G/100ML
INJECTION, SOLUTION INTRAVENOUS CONTINUOUS
OUTPATIENT
Start: 2024-09-11

## 2024-09-11 RX ORDER — DOCUSATE SODIUM 100 MG/1
200 CAPSULE, LIQUID FILLED ORAL 2 TIMES DAILY
OUTPATIENT
Start: 2024-09-11

## 2024-09-11 RX ORDER — DIPHENHYDRAMINE HYDROCHLORIDE 50 MG/ML
25 INJECTION INTRAMUSCULAR; INTRAVENOUS EVERY 4 HOURS PRN
OUTPATIENT
Start: 2024-09-11

## 2024-09-11 RX ORDER — CARBOPROST TROMETHAMINE 250 UG/ML
250 INJECTION, SOLUTION INTRAMUSCULAR
Status: DISCONTINUED | OUTPATIENT
Start: 2024-09-11 | End: 2024-09-13 | Stop reason: HOSPADM

## 2024-09-11 RX ADMIN — METOPROLOL SUCCINATE 25 MG: 25 TABLET, EXTENDED RELEASE ORAL at 09:09

## 2024-09-11 RX ADMIN — DOCUSATE SODIUM 200 MG: 100 CAPSULE, LIQUID FILLED ORAL at 09:09

## 2024-09-11 RX ADMIN — MORPHINE SULFATE 4 MG: 4 INJECTION, SOLUTION INTRAMUSCULAR; INTRAVENOUS at 06:09

## 2024-09-11 RX ADMIN — IBUPROFEN 600 MG: 600 TABLET, FILM COATED ORAL at 06:09

## 2024-09-11 RX ADMIN — IBUPROFEN 600 MG: 600 TABLET, FILM COATED ORAL at 12:09

## 2024-09-11 RX ADMIN — MORPHINE SULFATE 4 MG: 4 INJECTION, SOLUTION INTRAMUSCULAR; INTRAVENOUS at 02:09

## 2024-09-11 RX ADMIN — IBUPROFEN 600 MG: 600 TABLET, FILM COATED ORAL at 11:09

## 2024-09-11 RX ADMIN — OXYCODONE HYDROCHLORIDE AND ACETAMINOPHEN 1 TABLET: 10; 325 TABLET ORAL at 09:09

## 2024-09-11 RX ADMIN — OXYCODONE AND ACETAMINOPHEN 1 TABLET: 10; 325 TABLET ORAL at 08:09

## 2024-09-11 RX ADMIN — MISOPROSTOL 400 MCG: 100 TABLET ORAL at 03:09

## 2024-09-11 RX ADMIN — HUMAN RHO(D) IMMUNE GLOBULIN 300 MCG: 1500 SOLUTION INTRAMUSCULAR; INTRAVENOUS at 06:09

## 2024-09-11 RX ADMIN — DOCUSATE SODIUM 200 MG: 100 CAPSULE, LIQUID FILLED ORAL at 08:09

## 2024-09-11 RX ADMIN — OXYCODONE AND ACETAMINOPHEN 1 TABLET: 10; 325 TABLET ORAL at 12:09

## 2024-09-11 RX ADMIN — OXYCODONE AND ACETAMINOPHEN 1 TABLET: 10; 325 TABLET ORAL at 03:09

## 2024-09-11 NOTE — PROGRESS NOTES
POD #1    VSS afebrile    Breast= soft non tender  Fundus= firm, non tender, lochia moderate  Incision dry and intact, non indurated  Extremities- normal    Impression- POD stable     Plan cont post care, transfer to MB

## 2024-09-12 LAB
APICAL FOUR CHAMBER EJECTION FRACTION: 68 %
APICAL TWO CHAMBER EJECTION FRACTION: 70 %
AV INDEX (PROSTH): 0.8
AV MEAN GRADIENT: 8 MMHG
AV PEAK GRADIENT: 15 MMHG
AV VELOCITY RATIO: 0.8
BSA FOR ECHO PROCEDURE: 2.38 M2
CV ECHO LV RWT: 0.45 CM
DOP CALC AO PEAK VEL: 1.92 M/S
DOP CALC AO VTI: 36.4 CM
DOP CALC LVOT PEAK VEL: 1.54 M/S
DOP CALCLVOT PEAK VEL VTI: 29 CM
E WAVE DECELERATION TIME: 185 MSEC
E/A RATIO: 1.92
E/E' RATIO: 7.55 M/S
ECHO LV POSTERIOR WALL: 1 CM (ref 0.6–1.1)
FRACTIONAL SHORTENING: 41 % (ref 28–44)
INTERVENTRICULAR SEPTUM: 1.1 CM (ref 0.6–1.1)
LEFT ATRIUM AREA SYSTOLIC (APICAL 2 CHAMBER): 16.8 CM2
LEFT ATRIUM AREA SYSTOLIC (APICAL 4 CHAMBER): 15 CM2
LEFT ATRIUM SIZE: 4 CM
LEFT ATRIUM VOLUME INDEX MOD: 17.7 ML/M2
LEFT ATRIUM VOLUME MOD: 40.5 CM3
LEFT INTERNAL DIMENSION IN SYSTOLE: 2.6 CM (ref 2.1–4)
LEFT VENTRICLE DIASTOLIC VOLUME INDEX: 38.3 ML/M2
LEFT VENTRICLE DIASTOLIC VOLUME: 87.7 ML
LEFT VENTRICLE END DIASTOLIC VOLUME APICAL 2 CHAMBER: 86.9 ML
LEFT VENTRICLE END DIASTOLIC VOLUME APICAL 4 CHAMBER: 116 ML
LEFT VENTRICLE END SYSTOLIC VOLUME APICAL 2 CHAMBER: 45.6 ML
LEFT VENTRICLE END SYSTOLIC VOLUME APICAL 4 CHAMBER: 35 ML
LEFT VENTRICLE MASS INDEX: 69 G/M2
LEFT VENTRICLE SYSTOLIC VOLUME INDEX: 10.7 ML/M2
LEFT VENTRICLE SYSTOLIC VOLUME: 24.6 ML
LEFT VENTRICULAR INTERNAL DIMENSION IN DIASTOLE: 4.4 CM (ref 3.5–6)
LEFT VENTRICULAR MASS: 158.21 G
LV LATERAL E/E' RATIO: 6.88 M/S
LV SEPTAL E/E' RATIO: 8.36 M/S
LVED V (TEICH): 87.7 ML
LVES V (TEICH): 24.6 ML
LVOT MG: 5 MMHG
LVOT MV: 1.01 CM/S
MV PEAK A VEL: 0.61 M/S
MV PEAK E VEL: 1.17 M/S
OHS LV EJECTION FRACTION SIMPSONS BIPLANE MOD: 69 %
PV PEAK GRADIENT: 9 MMHG
PV PEAK VELOCITY: 1.48 M/S
RA PRESSURE ESTIMATED: 3 MMHG
TDI LATERAL: 0.17 M/S
TDI SEPTAL: 0.14 M/S
TDI: 0.16 M/S
TRICUSPID ANNULAR PLANE SYSTOLIC EXCURSION: 1.42 CM
Z-SCORE OF LEFT VENTRICULAR DIMENSION IN END DIASTOLE: -6.87
Z-SCORE OF LEFT VENTRICULAR DIMENSION IN END SYSTOLE: -5.6

## 2024-09-12 PROCEDURE — 25000003 PHARM REV CODE 250: Performed by: OBSTETRICS & GYNECOLOGY

## 2024-09-12 PROCEDURE — 11000001 HC ACUTE MED/SURG PRIVATE ROOM

## 2024-09-12 PROCEDURE — 99233 SBSQ HOSP IP/OBS HIGH 50: CPT | Mod: ,,, | Performed by: OBSTETRICS & GYNECOLOGY

## 2024-09-12 RX ADMIN — OXYCODONE AND ACETAMINOPHEN 1 TABLET: 10; 325 TABLET ORAL at 03:09

## 2024-09-12 RX ADMIN — METOPROLOL SUCCINATE 25 MG: 25 TABLET, EXTENDED RELEASE ORAL at 08:09

## 2024-09-12 RX ADMIN — IBUPROFEN 600 MG: 600 TABLET, FILM COATED ORAL at 01:09

## 2024-09-12 RX ADMIN — OXYCODONE AND ACETAMINOPHEN 1 TABLET: 10; 325 TABLET ORAL at 08:09

## 2024-09-12 RX ADMIN — OXYCODONE AND ACETAMINOPHEN 1 TABLET: 10; 325 TABLET ORAL at 02:09

## 2024-09-12 RX ADMIN — IBUPROFEN 600 MG: 600 TABLET, FILM COATED ORAL at 06:09

## 2024-09-12 RX ADMIN — IBUPROFEN 600 MG: 600 TABLET, FILM COATED ORAL at 08:09

## 2024-09-12 RX ADMIN — DOCUSATE SODIUM 200 MG: 100 CAPSULE, LIQUID FILLED ORAL at 08:09

## 2024-09-12 NOTE — ANESTHESIA POSTPROCEDURE EVALUATION
Anesthesia Post Evaluation    Patient: Ade Simms    Procedure(s) Performed: Procedure(s) (LRB):   SECTION (N/A)    Final Anesthesia Type: spinal      Patient location during evaluation: floor  Patient participation: Yes- Able to Participate  Level of consciousness: awake and alert  Post-procedure vital signs: reviewed and stable  Pain management: adequate  Airway patency: patent    PONV status at discharge: No PONV  Anesthetic complications: no      Respiratory status: unassisted  Hydration status: euvolemic  Follow-up not needed.              Vitals Value Taken Time   /70 24 0755   Temp 36.9 °C (98.5 °F) 24 0755   Pulse 80 24 0755   Resp 16 24 0833   SpO2 98 % 24 0755         Event Time   Out of Recovery 09:12:00         Pain/Raymond Score: Pain Rating Prior to Med Admin: 6 (2024  8:33 AM)  Pain Rating Post Med Admin: 0 (2024  4:00 AM)

## 2024-09-12 NOTE — PROGRESS NOTES
POD #2    VSS afebrile    Breast= soft non tender  Fundus= firm, non tender, lochia moderate  Incision dry and intact, non indurated  Extremities- normal    Impression- POD 2 stable     Plan cont post care

## 2024-09-12 NOTE — PROGRESS NOTES
Progress Note  Maternal Fetal Medicine          Subjective:         Ade Simms is a 29 y.o.  female with IUP at 38w3d admitted for term pregnancy in the setting of ACTA2 mutation (heritable aortopathy condition), S/p C/S, POD#2.    She is feeling well and has no current complaints. She has some mild incisional pain. She denies SOA or chest pain. She is tolerating PO and ambulating. She is breastfeeding well.   Echo ordered for today.        Objective:         Temp:  [98.3 °F (36.8 °C)-98.9 °F (37.2 °C)] 98.5 °F (36.9 °C)  Pulse:  [71-91] 80  Resp:  [16-18] 16  SpO2:  [95 %-98 %] 98 %  BP: (101-131)/(62-82) 112/70    Gen: NAD, A&Ox3  Pulm: Unlabored breathing, LCTAB  Card: RRR  Abd: abdomen appropriately tender to palpation, incision with bandage c/d/i  Extremities: Palpable peripheral pulses, no pedal edema,  DTRs x 4    Lab Review  Blood Type: A NEG    No results found for this or any previous visit (from the past 24 hour(s)).      Assessment:       29 y.o.  at 38w3d weeks gestation admitted for term pregnancy in the setting of ACTA2 mutation (heritable aortopathy condition), S/p C/S, POD#2.    Active Hospital Problems    Diagnosis  POA    *38 weeks gestation of pregnancy [Z3A.38]  Not Applicable    Monoallelic mutation of ACTA2 gene [Z15.89]  Not Applicable    Delivery by elective  section [O82]  Unknown    - Hereditary disease in family possibly affecting fetus, fetus 1 [O35.2XX1]  Yes      Resolved Hospital Problems   No resolved problems to display.        Plan:     1. Maternal cardiac disease (POA)  She has an ACTA2 gene mutation which increases her risk for an aortic dissection. Her father and her sister also have this gene mutation. Her father had an aortic dissection at the age of 50. Her sister had a complicated type B dissection at the age of 26 and survived. Her sister had three successful pregnancies.   She's been followed by a cardiologist at the [facility].  She had normal aortic imaging approx 6-7 yrs ago. Her last evaluation was 1/31/24 where echo results were normal (Aortic root 3.0; EF 60%).     Mutations in ACTA2 (codes for smooth muscle specific alpha-actin, a critical component of the contractile apparatus of the vascular smooth muscle cell) predispose to thoracic aortic aneurysms and dissection as well as coronary artery and cerebrovascular disease. In one case control study, 53 women with this mutation were followed in 137 pregnancies.  eight had aortic dissections in the third trimester or the postpartum period (6% of pregnancies). One woman also had a myocardial infarct that occurred during pregnancy that was independent of her aortic dissection. Compared to the population-based frequency of peripartum aortic dissections of 0.6%, the rate of peripartum aortic dissections in women with ACTA2 mutations is much higher (8 out of 39; 20%). Six of these dissections initiated in the ascending aorta (Anthony type A), three were fatal. Three women had ascending aortic dissections at diameters less that 5.0?cm (range 3.8-4.7?cm). Aortic pathology showed mild to moderate medial degeneration of the aorta in three women. Of note, five of the women had hypertension either during or before the pregnancy. In summary, the majority of women with ACTA2 mutations did not have aortic or other vascular complications with pregnancy. However, these findings show that pregnancy is associated with significant risk for aortic dissection in women with ACTA2 mutations. Women with ACTA2 mutations who are planning to get pregnant should be counseled about this risk of aortic dissection, and proper clinical management should be initiated to reduce this risk.  Other associated features of this mutation may be ocular abnormalities (iris flocculi) and skin manifestations (livedo reticularis).     Recommendations:  - Recommend beta blockade.  Continue Metoprolol (ordered). OK to stop ASA.  Tight BP control with goal of 120/80.  - Telemetry x 24 hrs postpartum on antepartum  - Monitor for PPH- Avoid methergine and Hemabate and try to use cytotec, TXA as able.  - Precautions for CP, SOA, arrhythmia, vital sign changes, BP elevations.   - OK to breastfeed.  - Echo ordered today (recommended within 1 week of delivery).   - DC tomorrow (>72 hrs)    2. A neg  -  blood type w/u for possible rhogam      Thank you for allowing us to participate in the care of your patient. If you have any questions/concerns, please do not hesitate to contact us.     Meli Samayoa MD  Maternal Fetal Medicine   [phone]

## 2024-09-12 NOTE — PLAN OF CARE
Problem: Adult Inpatient Plan of Care  Goal: Plan of Care Review  2024 by Nadeen Tate RN  Outcome: Progressing  2024 by Ndaeen Tate RN  Outcome: Progressing  Goal: Patient-Specific Goal (Individualized)  2024 by Nadeen Tate RN  Outcome: Progressing  2024 by Nadeen Tate RN  Outcome: Progressing  Goal: Absence of Hospital-Acquired Illness or Injury  2024 by Nadeen Tate RN  Outcome: Progressing  2024 by Nadeen Tate RN  Outcome: Progressing  Goal: Optimal Comfort and Wellbeing  2024 by Nadeen Tate RN  Outcome: Progressing  2024 by Nadeen Tate RN  Outcome: Progressing  Goal: Readiness for Transition of Care  2024 by Nadeen Tate RN  Outcome: Progressing  2024 by Nadeen Tate RN  Outcome: Progressing     Problem:  Fall Injury Risk  Goal: Absence of Fall, Infant Drop and Related Injury  2024 by Nadeen Tate RN  Outcome: Progressing  2024 by Nadeen Tate RN  Outcome: Progressing     Problem: Infection  Goal: Absence of Infection Signs and Symptoms  2024 by Nadeen Tate RN  Outcome: Progressing  2024 by Nadeen Tate RN  Outcome: Progressing     Problem: Wound  Goal: Optimal Coping  2024 by Nadeen Tate RN  Outcome: Progressing  2024 by Nadeen Tate RN  Outcome: Progressing  Goal: Optimal Functional Ability  2024 by Nadeen Tate RN  Outcome: Progressing  2024 by Nadeen Tate RN  Outcome: Progressing  Goal: Absence of Infection Signs and Symptoms  2024 by Nadeen Tate RN  Outcome: Progressing  2024 by Nadeen Tate RN  Outcome: Progressing  Goal: Improved Oral Intake  2024 by Nadeen Tate RN  Outcome: Progressing  2024 by Nadeen Tate RN  Outcome: Progressing  Goal: Optimal Pain  Control and Function  2024 by Nadeen Tate RN  Outcome: Progressing  2024 by Nadeen Tate RN  Outcome: Progressing  Goal: Skin Health and Integrity  2024 by Nadeen Tate RN  Outcome: Progressing  2024 by Nadeen Tate RN  Outcome: Progressing  Goal: Optimal Wound Healing  2024 by Nadeen Tate RN  Outcome: Progressing  2024 by Nadeen Tate RN  Outcome: Progressing     Problem: Postpartum ( Delivery)  Goal: Successful Parent Role Transition  2024 by Nadeen Tate RN  Outcome: Progressing  2024 by Nadeen Tate RN  Outcome: Progressing  Goal: Hemostasis  2024 by Nadeen Tate RN  Outcome: Progressing  2024 by Nadeen Tate RN  Outcome: Progressing  Goal: Effective Bowel Elimination  2024 by Nadeen Tate RN  Outcome: Progressing  2024 by Nadeen Tate RN  Outcome: Progressing  Goal: Fluid and Electrolyte Balance  2024 by Nadeen Tate RN  Outcome: Progressing  2024 by Nadeen Tate RN  Outcome: Progressing  Goal: Absence of Infection Signs and Symptoms  2024 by Nadeen Tate RN  Outcome: Progressing  2024 by Nadeen Tate RN  Outcome: Progressing  Goal: Anesthesia/Sedation Recovery  2024 by Nadeen Tate RN  Outcome: Progressing  2024 by Nadeen Tate RN  Outcome: Progressing  Goal: Optimal Pain Control and Function  2024 by Nadeen Tate RN  Outcome: Progressing  2024 by Nadeen Tate RN  Outcome: Progressing  Goal: Nausea and Vomiting Relief  2024 08 by Nadeen Tate RN  Outcome: Progressing  2024 by Nadeen Tate RN  Outcome: Progressing  Goal: Effective Urinary Elimination  2024 by Nadeen Tate RN  Outcome: Progressing  2024 by Nadeen Tate RN  Outcome: Progressing  Goal:  Effective Oxygenation and Ventilation  9/12/2024 0844 by Nadeen Tate RN  Outcome: Progressing  9/12/2024 0844 by Nadeen Tate, RN  Outcome: Progressing

## 2024-09-13 ENCOUNTER — LACTATION ENCOUNTER (OUTPATIENT)
Dept: OBSTETRICS AND GYNECOLOGY | Facility: HOSPITAL | Age: 29
End: 2024-09-13

## 2024-09-13 VITALS
BODY MASS INDEX: 37.92 KG/M2 | SYSTOLIC BLOOD PRESSURE: 134 MMHG | TEMPERATURE: 98 F | HEIGHT: 69 IN | OXYGEN SATURATION: 97 % | HEART RATE: 82 BPM | RESPIRATION RATE: 18 BRPM | DIASTOLIC BLOOD PRESSURE: 84 MMHG | WEIGHT: 256 LBS

## 2024-09-13 LAB
ABO + RH BLD: NORMAL
ABO + RH BLD: NORMAL
BLD PROD TYP BPU: NORMAL
BLD PROD TYP BPU: NORMAL
BLOOD UNIT EXPIRATION DATE: NORMAL
BLOOD UNIT EXPIRATION DATE: NORMAL
BLOOD UNIT TYPE CODE: 600
BLOOD UNIT TYPE CODE: 600
CROSSMATCH INTERPRETATION: NORMAL
CROSSMATCH INTERPRETATION: NORMAL
DISPENSE STATUS: NORMAL
DISPENSE STATUS: NORMAL
UNIT NUMBER: NORMAL
UNIT NUMBER: NORMAL

## 2024-09-13 PROCEDURE — 25000003 PHARM REV CODE 250: Performed by: OBSTETRICS & GYNECOLOGY

## 2024-09-13 RX ADMIN — OXYCODONE AND ACETAMINOPHEN 1 TABLET: 10; 325 TABLET ORAL at 09:09

## 2024-09-13 RX ADMIN — DOCUSATE SODIUM 200 MG: 100 CAPSULE, LIQUID FILLED ORAL at 08:09

## 2024-09-13 RX ADMIN — IBUPROFEN 600 MG: 600 TABLET, FILM COATED ORAL at 02:09

## 2024-09-13 RX ADMIN — OXYCODONE AND ACETAMINOPHEN 1 TABLET: 10; 325 TABLET ORAL at 12:09

## 2024-09-13 RX ADMIN — IBUPROFEN 600 MG: 600 TABLET, FILM COATED ORAL at 08:09

## 2024-09-13 RX ADMIN — METOPROLOL SUCCINATE 25 MG: 25 TABLET, EXTENDED RELEASE ORAL at 08:09

## 2024-09-13 NOTE — NURSING
Went over all discharge instructions with patient. She stated that she understood all directions and had no questions at this time.

## 2024-09-13 NOTE — LACTATION NOTE
This note was copied from a baby's chart.  Mom says feeds are going well and she feels that her milk is increasing. Discharge instructions reviewed. Answered moms questions. Verbalized understanding of all.       [facility]   [phone]   Discharge Instructions      Feed baby when you notice early hunger cues, such as rooting, hand to mouth, smacking lips, sticking out tongue.  Crying is a late sign of hunger. Try to get baby to the breast before crying begins. (page 2 & 39 of booklet)      Most newborns need to eat at least 8 times in a 24-hour period. Feeding often will help develop milk supply.  Feed baby anytime hunger cues are noticed, and wake baby if needed to ensure 8 or more feeds per 24 hour period. (pg. 24)      Cluster feeding is normal: baby may nurse very often for several times in a row.  This commonly occurs in the evening or early part of the night. (pg. 4)       Allow your baby to finish one side before offering the other. You can try to burp baby and then offer the other breast if he/she seems to still be hungry.       Skin to skin contact can help a sleepy baby want to nurse. Babies held skin to skin, often nurse better and longer. Skin to skin increases moms milk-making hormone levels as well. Skin to skin is calming for mom and baby. (pg. 23)      In the early days, the number of wet and dirty diapers baby has each day can help you know if baby is getting enough to eat.  Refer to your breastfeeding booklet (pgs. 2-12) to see how many wet/dirty diapers baby should be having each day.  Contact babys pediatrician or lactation, if baby is not having enough wet and dirty diapers.      It is best to avoid pacifiers and bottles for the first 4 weeks, while getting breastfeeding established.        Back to work or school:  4 weeks is a good time to start pumping after morning feeds, in order to store milk for baby. Although you may pump before if needed. Week 4 is also a good time to  introduce a bottle of pumped milk to baby, if you will be going back to work or school.  This can be done sooner if needed. (Refer to pgs 25-27 for pumping and milk storage)       When baby is latched deeply to your breast, you should feel a strong tugging or pulling sensation. If your nipples are sore, you may feel mild discomfort with initial latch that eases quickly.  If there is pain, try to adjust the latch. Make sure your baby opens his mouth wide to latch on. Scan the QR code on page 18 for a video on latching.       Listen for swallowing when baby is nursing. This indicates your baby is transferring that milk!      Your milk will increase between days 3-5.  Frequent feeds can help prevent engorgement.      If your breasts begin to get engorged, refer to pages 20 & 21 for tips on management.  Feed often to help keep your breasts comfortable. If baby is not able to remove milk from your breasts, pumping will be needed to relieve breast fullness and build milk supply. If baby is removing milk well from your breasts, but they are still uncomfortably full after, You may need to pump for comfort, meaning just pump enough to relieve the fullness.      No soap or lotions to the nipples except for medical grade lanolin or nipple cream for soreness.        All babies go through growth spurts. The first one is generally around 2-3 weeks. If your baby starts to nurse a lot more than usual, this is likely the reason.  Growth spurts happen every so often, and usually lasts for 3-5 days.      Remember to check the safety of any medications, prescription or non-prescription, and herbals, before you take them.  Your babys pediatrician is the best one to confirm the safety of the medication while you are breastfeeding. You may also the lactation department, or the [facility] at [phone], to check the safety of a medication. (pg 31)      Call with any questions or concerns. Dont wait --ask for help early.  Breastfeeding Resources can be found on pages 33-35 of your Breastfeeding Booklet given to you in the hospital.

## 2024-09-13 NOTE — DISCHARGE SUMMARY
[facility] - 3rd Floor Mother/Baby Unit  Obstetrics  Discharge Summary      Patient Name: Ade Simms  MRN: [id number]  Admission Date: 9/10/2024  Hospital Length of Stay: 2 days  Discharge Date and Time: No discharge date for patient encounter.  Attending Physician: Joe Aly MD   Discharging Provider: Joe Aly MD  Primary Care Provider: Fidel Oakes MD    HPI: admitted for cs delivery secondary to ACTA2 gene mutation    Procedure(s) (LRB):   SECTION (N/A)     Hospital Course: normal post recovery from c/s.    Consults (From admission, onward)          Status Ordering Provider     Inpatient consult to Maternal Fetal Medicine  Once        Provider:  Meli Samayoa MD    Completed MELI SAMAYOA            Final Active Diagnoses:    Diagnosis Date Noted POA    PRINCIPAL PROBLEM:  38 weeks gestation of pregnancy [Z3A.38] 09/10/2024 Not Applicable    Monoallelic mutation of ACTA2 gene [Z15.89] 09/10/2024 Not Applicable    Delivery by elective  section [O82] 09/10/2024 Unknown    - Hereditary disease in family possibly affecting fetus, fetus 1 [O35.2XX1] 2024 Yes      Problems Resolved During this Admission:        Labs: CBC   Recent Labs   Lab 24  0433   WBC 9.44   HGB 9.9*   HCT 28.5*          Feeding Method: breast    Immunizations       Date Immunization Status Dose Route/Site Given by    09/10/24 0849 MMR Incomplete 0.5 mL Subcutaneous/     09/10/24 0849 Tdap Incomplete 0.5 mL Intramuscular/     24 0614 Rho (D) Immune Globulin - IM Given 300 mcg Intravenous/ Jessica Steel RN            Delivery:    Episiotomy: None   Lacerations: None   Repair suture: None   Repair # of packets:     Blood loss (ml):       Birth information:  YOB: 2026   Time of birth: 7:36 AM   Sex: male   Delivery type: , Low Transverse   Gestational Age: 38w3d     Measurements    Weight: 3600 g  Weight (lbs): 7 lb 15  "oz  Length: 53.3 cm  Length (in): 21"  Head circumference: 35.6 cm         Delivery Clinician: Delivery Providers    Delivering clinician: Joe Aly MD   Provider Role    Meli Brown RN Delivery Nurse             Additional  information:  Forceps:    Vacuum:    Breech:    Observed anomalies      Living?:     Apgars    Living status: Living  Apgar Component Scores:  1 min.:  5 min.:  10 min.:  15 min.:  20 min.:    Skin color:  0  0       Heart rate:  2  2       Reflex irritability:  2  2       Muscle tone:  2  2       Respiratory effort:  2  2       Total:  8  8       Apgars assigned by: ILENE GRAY RN/NICU         Placenta: Delivered:       appearance  Pending Diagnostic Studies:       None            Discharged Condition: good    Disposition: Home or Self Care    Follow Up:   Follow-up Information       Joe Aly MD. Schedule an appointment as soon as possible for a visit in 5 week(s).    Specialty: Obstetrics and Gynecology  Why: Call office as soon as possible to make an appointment to be seen within 5-6 weeks.  Contact information:  [address] [place] [address] [phone]                           Patient Instructions:   No discharge procedures on file.  Medications:  Current Discharge Medication List        CONTINUE these medications which have NOT CHANGED    Details   metoprolol succinate (TOPROL-XL) 25 MG 24 hr tablet Take 25 mg by mouth once daily.      omeprazole (PRILOSEC) 20 MG capsule Take 1 capsule (20 mg total) by mouth 2 (two) times a day.  Qty: 60 capsule, Refills: 3    Associated Diagnoses: Gastroesophageal reflux during pregnancy in second trimester, antepartum      prenatal vit/iron fum/folic ac (PRENATAL 1+1 ORAL) Take by mouth.           STOP taking these medications       aspirin 81 MG Chew Comments:   Reason for Stopping:               Joe Aly MD  Obstetrics  [facility] - 3rd Floor Mother/Baby Unit  "

## 2024-10-01 ENCOUNTER — PATIENT MESSAGE (OUTPATIENT)
Dept: MATERNAL FETAL MEDICINE | Facility: CLINIC | Age: 29
End: 2024-10-01
Payer: COMMERCIAL

## 2024-11-21 DIAGNOSIS — Z00.00 ANNUAL PHYSICAL EXAM: Primary | ICD-10-CM

## 2024-11-21 DIAGNOSIS — Z13.6 SCREENING FOR CARDIOVASCULAR, RESPIRATORY, AND GENITOURINARY DISEASES: ICD-10-CM

## 2024-11-21 DIAGNOSIS — Z13.83 SCREENING FOR CARDIOVASCULAR, RESPIRATORY, AND GENITOURINARY DISEASES: ICD-10-CM

## 2024-11-21 DIAGNOSIS — Z13.21 SCREENING FOR ENDOCRINE, NUTRITIONAL, METABOLIC AND IMMUNITY DISORDER: ICD-10-CM

## 2024-11-21 DIAGNOSIS — Z13.0 SCREENING FOR ENDOCRINE, NUTRITIONAL, METABOLIC AND IMMUNITY DISORDER: ICD-10-CM

## 2024-11-21 DIAGNOSIS — Z13.89 SCREENING FOR CARDIOVASCULAR, RESPIRATORY, AND GENITOURINARY DISEASES: ICD-10-CM

## 2024-11-21 DIAGNOSIS — E01.0 THYROMEGALY: ICD-10-CM

## 2024-11-21 DIAGNOSIS — Z13.29 SCREENING FOR ENDOCRINE, NUTRITIONAL, METABOLIC AND IMMUNITY DISORDER: ICD-10-CM

## 2024-11-21 DIAGNOSIS — Z13.228 SCREENING FOR ENDOCRINE, NUTRITIONAL, METABOLIC AND IMMUNITY DISORDER: ICD-10-CM

## 2024-11-25 ENCOUNTER — TELEPHONE (OUTPATIENT)
Dept: INTERNAL MEDICINE | Facility: CLINIC | Age: 29
End: 2024-11-25
Payer: COMMERCIAL

## 2024-11-25 NOTE — TELEPHONE ENCOUNTER
"----- Message from Nurse Cooper sent at 11/25/2024  8:03 AM CST -----  Regarding: Dr. Oakes 12/02/2024 Wellness 2:20p[m  Are there any outstanding tasks in chart? No, but needs FASTING labs, TO BE DONE AT  "[facility]" or lab location of choice PRIOR to appt    Is there any documentation of tasks? no    Has the pt seen another physician, been to ER, UCC, or admitted to hospital since last visit?    Has the pt done blood work or imaging since last visit?    5. PLEASE HAVE PATIENT BRING MEDICATION LIST OR BOTTLES TO EVERY OFFICE VISIT  "

## 2024-12-11 ENCOUNTER — HOSPITAL ENCOUNTER (OUTPATIENT)
Dept: RADIOLOGY | Facility: HOSPITAL | Age: 29
Discharge: HOME | End: 2024-12-11
Attending: THORACIC SURGERY (CARDIOTHORACIC VASCULAR SURGERY)
Payer: COMMERCIAL

## 2024-12-11 DIAGNOSIS — O35.2XX1 HEREDITARY DISEASE IN FAMILY POSSIBLY AFFECTING FETUS, FETUS 1: ICD-10-CM

## 2024-12-11 PROCEDURE — 71275 CT ANGIOGRAPHY CHEST: CPT | Mod: TC

## 2024-12-11 PROCEDURE — 25500020 PHARM REV CODE 255: Performed by: THORACIC SURGERY (CARDIOTHORACIC VASCULAR SURGERY)

## 2024-12-11 RX ADMIN — IOHEXOL 100 ML: 350 INJECTION, SOLUTION INTRAVENOUS at 12:12

## 2024-12-18 ENCOUNTER — OFFICE VISIT (OUTPATIENT)
Dept: CARDIAC SURGERY | Facility: CLINIC | Age: 29
End: 2024-12-18
Payer: COMMERCIAL

## 2024-12-18 VITALS
HEIGHT: 69 IN | HEART RATE: 95 BPM | WEIGHT: 255.94 LBS | BODY MASS INDEX: 37.91 KG/M2 | DIASTOLIC BLOOD PRESSURE: 85 MMHG | OXYGEN SATURATION: 97 % | SYSTOLIC BLOOD PRESSURE: 120 MMHG

## 2024-12-18 DIAGNOSIS — I71.00 AORTIC ANEURYSM AND DISSECTION: ICD-10-CM

## 2024-12-18 DIAGNOSIS — O35.2XX1 HEREDITARY DISEASE IN FAMILY POSSIBLY AFFECTING FETUS, FETUS 1: Primary | ICD-10-CM

## 2024-12-18 PROCEDURE — 1159F MED LIST DOCD IN RCRD: CPT | Mod: CPTII,,, | Performed by: THORACIC SURGERY (CARDIOTHORACIC VASCULAR SURGERY)

## 2024-12-18 PROCEDURE — 3079F DIAST BP 80-89 MM HG: CPT | Mod: CPTII,,, | Performed by: THORACIC SURGERY (CARDIOTHORACIC VASCULAR SURGERY)

## 2024-12-18 PROCEDURE — 1160F RVW MEDS BY RX/DR IN RCRD: CPT | Mod: CPTII,,, | Performed by: THORACIC SURGERY (CARDIOTHORACIC VASCULAR SURGERY)

## 2024-12-18 PROCEDURE — 3008F BODY MASS INDEX DOCD: CPT | Mod: CPTII,,, | Performed by: THORACIC SURGERY (CARDIOTHORACIC VASCULAR SURGERY)

## 2024-12-18 PROCEDURE — 3074F SYST BP LT 130 MM HG: CPT | Mod: CPTII,,, | Performed by: THORACIC SURGERY (CARDIOTHORACIC VASCULAR SURGERY)

## 2024-12-18 PROCEDURE — 99214 OFFICE O/P EST MOD 30 MIN: CPT | Mod: ,,, | Performed by: THORACIC SURGERY (CARDIOTHORACIC VASCULAR SURGERY)

## 2024-12-18 RX ORDER — OXYCODONE AND ACETAMINOPHEN 5; 325 MG/1; MG/1
1 TABLET ORAL EVERY 6 HOURS PRN
COMMUNITY
Start: 2024-09-04

## 2024-12-18 RX ORDER — IBUPROFEN 600 MG/1
TABLET ORAL
COMMUNITY
Start: 2024-09-03

## 2024-12-18 NOTE — PROGRESS NOTES
History & Physical    SUBJECTIVE:     History of Present Illness:  The patient returns to the clinic for follow-up ascending aortic aneurysm.  She had a  for delivery.  She had done very well with no complications.  She had a CTA recently revealing root size of 3 cm      Chief Complaint   Patient presents with    Follow-up     F/U W/ CTA CHEST RESULTS (24). DX: AORTIC ROOT 3 CM W/ ASCENDING ARCH 3CM, DESCENDING ABD AORTA NORMAL SIZE. PT WAS PREGNANT LAST VISIT, HX OF ACTA 2 GENE MUTATION, STRONG FAMILY HX. C-SECT 24.       Review of patient's allergies indicates:  No Known Allergies    Current Outpatient Medications   Medication Sig Dispense Refill    ibuprofen (ADVIL,MOTRIN) 600 MG tablet TAKE 1 TABLET BY MOUTH EVERY 8 HOURS EXPIRES ON EXPIRES ON      metoprolol succinate (TOPROL-XL) 25 MG 24 hr tablet Take 25 mg by mouth once daily.      omeprazole (PRILOSEC) 20 MG capsule Take 1 capsule (20 mg total) by mouth 2 (two) times a day. 60 capsule 3    oxyCODONE-acetaminophen (PERCOCET) 5-325 mg per tablet Take 1 tablet by mouth every 6 (six) hours as needed.      prenatal vit/iron fum/folic ac (PRENATAL 1+1 ORAL) Take by mouth.       No current facility-administered medications for this visit.       Past Medical History:   Diagnosis Date    Family history of genetic disease     Dad has tested positive for: familial thoracic aortic aneurysm and dissection (FTAAD)     Past Surgical History:   Procedure Laterality Date     SECTION N/A 9/10/2024    Procedure:  SECTION;  Surgeon: Joe Aly MD;  Location: [facility];  Service: OB/GYN;  Laterality: N/A;  PRIMARY SECTION // EDC 24   //  WILL BE 38 WEEKS 3D ON 9/10 //      CHOLECYSTECTOMY      DILATION AND CURETTAGE OF UTERUS       Family History   Problem Relation Name Age of Onset    Aortic dissection Father      Aortic dissection Sister       Social History     Tobacco Use    Smoking status: Never    Smokeless  tobacco: Never   Substance Use Topics    Alcohol use: Not Currently    Drug use: Never        Review of Systems:  Review of Systems   Constitutional: Negative.    HENT: Negative.     Eyes: Negative.    Respiratory: Negative.     Cardiovascular: Negative.    Gastrointestinal: Negative.    Endocrine: Negative.    Genitourinary: Negative.    Musculoskeletal: Negative.         Claudications   Skin: Negative.    Allergic/Immunologic: Negative.    Neurological: Negative.    Hematological: Negative.    Psychiatric/Behavioral: Negative.         OBJECTIVE:     Vital Signs (Most Recent)              Physical Exam:  Physical Exam  Vitals reviewed.   Constitutional:       Appearance: Normal appearance.   HENT:      Head: Normocephalic and atraumatic.      Nose: Nose normal.      Mouth/Throat:      Mouth: Mucous membranes are dry.      Pharynx: Oropharynx is clear.   Eyes:      Extraocular Movements: Extraocular movements intact.      Conjunctiva/sclera: Conjunctivae normal.      Pupils: Pupils are equal, round, and reactive to light.   Cardiovascular:      Rate and Rhythm: Normal rate and regular rhythm.      Pulses: Normal pulses.   Pulmonary:      Effort: Pulmonary effort is normal.      Breath sounds: Normal breath sounds.   Abdominal:      General: Abdomen is flat.      Palpations: Abdomen is soft.   Musculoskeletal:         General: Normal range of motion.      Cervical back: Neck supple.   Skin:     General: Skin is warm and dry.   Neurological:      General: No focal deficit present.   Psychiatric:         Mood and Affect: Mood normal.         Laboratory:  None      Diagnostic Results:  CTA of the chest revealed 3 cm ascending aortic size.  This looks normal for me.      ASSESSMENT/PLAN:     The patient was reassured.  Recommend repeat CTA in 12-18 months.  RTC then.

## 2024-12-26 ENCOUNTER — TELEPHONE (OUTPATIENT)
Dept: INTERNAL MEDICINE | Facility: CLINIC | Age: 29
End: 2024-12-26
Payer: COMMERCIAL

## 2024-12-26 NOTE — TELEPHONE ENCOUNTER
"----- Message from Nurse Cooper sent at 12/26/2024  7:45 AM CST -----  Regarding: Dr. Oakes 01/06/2025 wellness 2:20pm  Are there any outstanding tasks in chart? No, but needs FASTING labs, TO BE DONE AT  "[facility]" or lab location of choice PRIOR to appt    Is there any documentation of tasks? no    Has the pt seen another physician, been to ER, UCC, or admitted to hospital since last visit?    Has the pt done blood work or imaging since last visit?    5. PLEASE HAVE PATIENT BRING MEDICATION LIST OR BOTTLES TO EVERY OFFICE VISIT  "

## 2025-01-06 ENCOUNTER — OFFICE VISIT (OUTPATIENT)
Dept: INTERNAL MEDICINE | Facility: CLINIC | Age: 30
End: 2025-01-06
Payer: COMMERCIAL

## 2025-01-06 VITALS
HEIGHT: 69 IN | DIASTOLIC BLOOD PRESSURE: 80 MMHG | BODY MASS INDEX: 36.73 KG/M2 | SYSTOLIC BLOOD PRESSURE: 116 MMHG | WEIGHT: 248 LBS | OXYGEN SATURATION: 98 % | HEART RATE: 91 BPM

## 2025-01-06 DIAGNOSIS — Z00.00 ANNUAL PHYSICAL EXAM: Primary | ICD-10-CM

## 2025-01-06 PROCEDURE — 1160F RVW MEDS BY RX/DR IN RCRD: CPT | Mod: CPTII,,, | Performed by: INTERNAL MEDICINE

## 2025-01-06 PROCEDURE — 1159F MED LIST DOCD IN RCRD: CPT | Mod: CPTII,,, | Performed by: INTERNAL MEDICINE

## 2025-01-06 PROCEDURE — 3074F SYST BP LT 130 MM HG: CPT | Mod: CPTII,,, | Performed by: INTERNAL MEDICINE

## 2025-01-06 PROCEDURE — 3008F BODY MASS INDEX DOCD: CPT | Mod: CPTII,,, | Performed by: INTERNAL MEDICINE

## 2025-01-06 PROCEDURE — 99395 PREV VISIT EST AGE 18-39: CPT | Mod: ,,, | Performed by: INTERNAL MEDICINE

## 2025-01-06 PROCEDURE — 3079F DIAST BP 80-89 MM HG: CPT | Mod: CPTII,,, | Performed by: INTERNAL MEDICINE

## 2025-01-06 RX ORDER — SEMAGLUTIDE 0.5 MG/.5ML
0.5 INJECTION, SOLUTION SUBCUTANEOUS
Qty: 3 ML | Refills: 6 | Status: SHIPPED | OUTPATIENT
Start: 2025-01-06

## 2025-01-06 NOTE — PROGRESS NOTES
Patient ID: Ade Simms is a 29 y.o. female.    Chief Complaint: Annual Exam      HPI:   Patient presents here today for above reason.     Patient is without any major complaints or concerns at this time.     General health:good   Diet: regular  Exercise: walks   Caffeine:in am   Etoh: social   Tobacco: none     Colon Ca Screening: due at least every 10 years at 43  Breast Ca Screening: MMG due yearly starting at 40  Cervical Ca Screening: PAP due every 3 years  LMP: No LMP recorded.    The patient's Health Maintenance was reviewed and the following appears to be due at this time:   Health Maintenance Due   Topic Date Due    Cervical Cancer Screening  Never done    Lipid Panel  Never done    Influenza Vaccine (1) 2024    COVID-19 Vaccine (3 - 2024-25 season) 2024        Past Medical History:  Past Medical History:   Diagnosis Date    Family history of genetic disease     Dad has tested positive for: familial thoracic aortic aneurysm and dissection (FTAAD)     Past Surgical History:   Procedure Laterality Date     SECTION N/A 9/10/2024    Procedure:  SECTION;  Surgeon: Joe Aly MD;  Location: [facility];  Service: OB/GYN;  Laterality: N/A;  PRIMARY SECTION // EDC 24   //  WILL BE 38 WEEKS 3D ON 9/10 //      CHOLECYSTECTOMY      DILATION AND CURETTAGE OF UTERUS       Review of patient's allergies indicates:  No Known Allergies  Current Outpatient Medications on File Prior to Visit   Medication Sig Dispense Refill    [DISCONTINUED] ibuprofen (ADVIL,MOTRIN) 600 MG tablet TAKE 1 TABLET BY MOUTH EVERY 8 HOURS EXPIRES ON EXPIRES ON (Patient not taking: Reported on 2025)      [DISCONTINUED] metoprolol succinate (TOPROL-XL) 25 MG 24 hr tablet Take 25 mg by mouth once daily. (Patient not taking: Reported on 2025)      [DISCONTINUED] omeprazole (PRILOSEC) 20 MG capsule Take 1 capsule (20 mg total) by mouth 2 (two) times a day. (Patient not taking: Reported  "on 1/6/2025) 60 capsule 3    [DISCONTINUED] oxyCODONE-acetaminophen (PERCOCET) 5-325 mg per tablet Take 1 tablet by mouth every 6 (six) hours as needed. (Patient not taking: Reported on 1/6/2025)      [DISCONTINUED] prenatal vit/iron fum/folic ac (PRENATAL 1+1 ORAL) Take by mouth. (Patient not taking: Reported on 1/6/2025)       No current facility-administered medications on file prior to visit.     Social History     Socioeconomic History    Marital status:    Occupational History    Occupation: O.T.   Tobacco Use    Smoking status: Never    Smokeless tobacco: Never   Substance and Sexual Activity    Alcohol use: Not Currently    Drug use: Never    Sexual activity: Yes     Partners: Male     Family History   Problem Relation Name Age of Onset    Aortic dissection Father      Aortic dissection Sister         ROS:     Constitutional: No weight gain, No fever, No chills, No fatigue.   Eyes: No blurring, No visual disturbances.   Ear/Nose/Mouth/Throat: No decreased hearing, No ear pain, No nasal congestion, No sore throat.   Respiratory: No shortness of breath, No cough, No wheezing.   Cardiovascular: No chest pain, No palpitations, No peripheral edema.   Gastrointestinal: No nausea, No vomiting, No diarrhea, No constipation, No abdominal pain.   Genitourinary: No dysuria, No hematuria.   Hematology/Lymphatics: No bruising tendency, No bleeding tendency, No swollen lymph glands.   Endocrine: No excessive thirst, No polyuria, No excessive hunger.   Musculoskeletal: No joint pain, No muscle pain, No decreased range of motion.   Integumentary: No rash, No pruritus.   Neurologic: No abnormal balance, No confusion, No headache.   Psychiatric: No anxiety, No depression, Not suicidal, No hallucinations.      Vitals/PE:   /80 (BP Location: Left arm, Patient Position: Sitting)   Pulse 91   Ht 5' 9" (1.753 m)   Wt 112.5 kg (248 lb)   SpO2 98%   BMI 36.62 kg/m²     General: Alert and oriented, No acute " distress.   Eye: Normal conjunctiva without exudate.  HENMT: Normocephalic/AT, Normal hearing, Oral mucosa is moist and pink   Neck: No goiter visualized.   Respiratory: Lungs CTAB, Respirations are non-labored, Breath sounds are equal, Symmetrical chest wall expansion.  Cardiovascular: Normal rate, Regular rhythm, No murmur, No edema.   Gastrointestinal: Non-distended.   Genitourinary: Deferred.  Musculoskeletal: Normal ROM, Normal gait, No deformities or amputations.  Integumentary: Warm, Dry, Intact. No diaphoresis, or flushing.  Neurologic: No focal deficits, Cranial Nerves II-XII are grossly intact.   Psychiatric: Cooperative, Appropriate mood & affect, Normal judgment, Non-suicidal.    Assessment/Plan:    1. Annual physical exam  Comments:  we will get labs  Overview:  CMP  FLP  TSH T4  testosterone levles      Other orders  -     semaglutide, weight loss, (WEGOVY) 0.5 mg/0.5 mL PnIj; Inject 0.5 mg into the skin every 7 days.  Dispense: 3 mL; Refill: 6        ..  Medications Ordered This Encounter   Medications    semaglutide, weight loss, (WEGOVY) 0.5 mg/0.5 mL PnIj     Sig: Inject 0.5 mg into the skin every 7 days.     Dispense:  3 mL     Refill:  6        ..No orders of the defined types were placed in this encounter.        I have discontinued Ade Simms's metoprolol succinate, omeprazole, prenatal vit/iron fum/folic ac (PRENATAL 1+1 ORAL), ibuprofen, and oxyCODONE-acetaminophen. I am also having her start on WEGOVY.    No orders of the defined types were placed in this encounter.      Education and counseling done face to face regarding medical conditions and plan. Contact office if new symptoms develop. Should any symptoms ever significantly worsen seek emergency medical attention/go to ER. Follow up at least yearly for wellness or sooner PRN. Nurse to call patient with any results. The patient is receptive, expresses understanding and is agreeable to plan. All questions have been  answered.    Follow up in about 6 months (around 7/6/2025).

## 2025-02-10 ENCOUNTER — PATIENT OUTREACH (OUTPATIENT)
Dept: ADMINISTRATIVE | Facility: HOSPITAL | Age: 30
End: 2025-02-10
Payer: COMMERCIAL

## 2025-02-10 NOTE — PROGRESS NOTES
Letter sent by PCP staff on 2/5/25 requesting PAP results.  Response from Dr Milner--Patient has appointment on 3/24/25.   Reminder set to send for results after 3/25/25  Uploaded response to Media.

## 2025-07-02 ENCOUNTER — TELEPHONE (OUTPATIENT)
Dept: INTERNAL MEDICINE | Facility: CLINIC | Age: 30
End: 2025-07-02
Payer: COMMERCIAL

## 2025-07-02 NOTE — TELEPHONE ENCOUNTER
----- Message from Nurse Cooper sent at 7/1/2025  7:42 AM CDT -----  Regarding: Dr. Oakes 07/09/2025 6 mth 3;20pm  Are there any outstanding tasks in chart? No    Is there any documentation of tasks? No    Has the pt seen another physician, been to ER, [facility], or admitted to hospital since last visit?    Has the pt done blood work or imaging since last visit?     5. PLEASE HAVE PATIENT BRING MEDICATION LIST OR BOTTLES TO EVERY OFFICE VISIT

## 2025-08-18 PROBLEM — Z3A.38 38 WEEKS GESTATION OF PREGNANCY: Status: RESOLVED | Noted: 2024-09-10 | Resolved: 2025-08-18

## 2025-08-19 ENCOUNTER — LAB VISIT (OUTPATIENT)
Dept: LAB | Facility: HOSPITAL | Age: 30
End: 2025-08-19
Attending: OBSTETRICS & GYNECOLOGY
Payer: COMMERCIAL

## 2025-08-19 DIAGNOSIS — O09.291 PREVIOUS CHILD WITH ANOMALY, ANTEPARTUM, FIRST TRIMESTER: Primary | ICD-10-CM

## 2025-08-19 LAB
B-HCG FREE SERPL-ACNC: 27.54 MIU/ML
PROGEST SERPL-MCNC: 4.1 NG/ML

## 2025-08-19 PROCEDURE — 84144 ASSAY OF PROGESTERONE: CPT

## 2025-08-19 PROCEDURE — 36415 COLL VENOUS BLD VENIPUNCTURE: CPT

## 2025-08-19 PROCEDURE — 84702 CHORIONIC GONADOTROPIN TEST: CPT

## 2025-08-25 ENCOUNTER — LAB VISIT (OUTPATIENT)
Dept: LAB | Facility: HOSPITAL | Age: 30
End: 2025-08-25
Attending: OBSTETRICS & GYNECOLOGY
Payer: COMMERCIAL

## 2025-08-25 DIAGNOSIS — O09.291 PREVIOUS CHILD WITH ANOMALY, ANTEPARTUM, FIRST TRIMESTER: Primary | ICD-10-CM

## 2025-08-25 LAB
B-HCG FREE SERPL-ACNC: 195.4 MIU/ML
PROGEST SERPL-MCNC: 4.5 NG/ML

## 2025-08-25 PROCEDURE — 36415 COLL VENOUS BLD VENIPUNCTURE: CPT

## 2025-08-25 PROCEDURE — 84144 ASSAY OF PROGESTERONE: CPT

## 2025-08-25 PROCEDURE — 84702 CHORIONIC GONADOTROPIN TEST: CPT

## (undated) DEVICE — Device